# Patient Record
Sex: FEMALE | Race: WHITE | NOT HISPANIC OR LATINO | Employment: PART TIME | URBAN - METROPOLITAN AREA
[De-identification: names, ages, dates, MRNs, and addresses within clinical notes are randomized per-mention and may not be internally consistent; named-entity substitution may affect disease eponyms.]

---

## 2017-06-21 ENCOUNTER — OCCUPATIONAL MEDICINE (OUTPATIENT)
Dept: URGENT CARE | Facility: CLINIC | Age: 32
End: 2017-06-21
Payer: COMMERCIAL

## 2017-06-21 VITALS
HEIGHT: 70 IN | RESPIRATION RATE: 20 BRPM | SYSTOLIC BLOOD PRESSURE: 120 MMHG | OXYGEN SATURATION: 98 % | DIASTOLIC BLOOD PRESSURE: 78 MMHG | WEIGHT: 190 LBS | BODY MASS INDEX: 27.2 KG/M2 | HEART RATE: 62 BPM | TEMPERATURE: 97.5 F

## 2017-06-21 DIAGNOSIS — Z02.1 PRE-EMPLOYMENT DRUG SCREENING: ICD-10-CM
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
## 2017-06-21 DIAGNOSIS — S61.451A CAT BITE OF HAND, RIGHT, INITIAL ENCOUNTER: Primary | ICD-10-CM

## 2017-06-21 LAB
AMP AMPHETAMINE: NORMAL
BREATH ALCOHOL COMMENT: NORMAL
COC COCAINE: NORMAL
INT CON NEG: NORMAL
INT CON POS: NORMAL
MET METHAMPHETAMINES: NORMAL
OPI OPIATES: NORMAL
PCP PHENCYCLIDINE: NORMAL
POC BREATHALIZER: 0 PERCENT (ref 0–0.01)
POC DRUG COMMENT 753798-OCCUPATIONAL HEALTH: NEGATIVE
THC: NORMAL

## 2017-06-21 PROCEDURE — 99214 OFFICE O/P EST MOD 30 MIN: CPT | Mod: 29 | Performed by: PHYSICIAN ASSISTANT

## 2017-06-21 PROCEDURE — 82075 ASSAY OF BREATH ETHANOL: CPT | Mod: 29 | Performed by: PHYSICIAN ASSISTANT

## 2017-06-21 PROCEDURE — 80305 DRUG TEST PRSMV DIR OPT OBS: CPT | Mod: 29 | Performed by: PHYSICIAN ASSISTANT

## 2017-06-21 RX ORDER — AMOXICILLIN AND CLAVULANATE POTASSIUM 875; 125 MG/1; MG/1
1 TABLET, FILM COATED ORAL 2 TIMES DAILY
Qty: 20 TAB | Refills: 0 | Status: SHIPPED | OUTPATIENT
Start: 2017-06-21 | End: 2017-07-01

## 2017-06-21 NOTE — MR AVS SNAPSHOT
"        Jane Torrez   2017 8:45 AM   Occupational Medicine   MRN: [id number]    Department:  [facility]   Dept Phone:  [phone]    Description:  Female : 1985   Provider:  Nnamdi Mixon PA-C           Reason for Visit     Animal Bite Today cat bite right hand at ork , couple puncture wounds .    Puncture Wound           Allergies as of 2017     Allergen Noted Reactions    Nkda [No Known Drug Allergy] 2011         You were diagnosed with     Cat bite of hand, right, initial encounter   [545185]  -  Primary     Pre-employment drug screening   [210759]         Vital Signs     Blood Pressure Pulse Temperature Respirations Height Weight    120/78 mmHg 62 36.4 °C (97.5 °F) 20 1.778 m (5' 10\") 86.183 kg (190 lb)    Body Mass Index Oxygen Saturation Last Menstrual Period Smoking Status          27.26 kg/m2 98% 2010 Never Smoker         Basic Information     Date Of Birth Sex Race Ethnicity Preferred Language    1985 Female White Non- English      Your appointments     2017 11:00 AM   Workers Compensation with [facility]   [facility] ([facility])    [address] [place] [address] [address]  [place] [id number] [phone]              Problem List              ICD-10-CM Priority Class Noted - Resolved    Drug use F19.90   2010 - Present    Postpartum care and examination    2011 - Present    Closed fracture of middle or proximal phalanx or phalanges of hand [id number]   2015 - Present      Health Maintenance        Date Due Completion Dates    PAP SMEAR 2013    IMM DTaP/Tdap/Td Vaccine (3 - Td) 2025, 2011            Current Immunizations     Tdap Vaccine 2015  8:43 AM, 2011  1:00 PM      Below and/or attached are the medications your provider expects you to take. Review all of your home medications and newly ordered medications with your provider and/or pharmacist. Follow " medication instructions as directed by your provider and/or pharmacist. Please keep your medication list with you and share with your provider. Update the information when medications are discontinued, doses are changed, or new medications (including over-the-counter products) are added; and carry medication information at all times in the event of emergency situations     Allergies:  NKDA - (reactions not documented)               Medications  Valid as of: June 21, 2017 - 10:18 AM    Generic Name Brand Name Tablet Size Instructions for use    Amoxicillin-Pot Clavulanate (Tab) AUGMENTIN 875-125 MG Take 1 Tab by mouth 2 times a day for 10 days.        Hydrocodone-Acetaminophen (Tab) NORCO 5-325 MG Take 1-2 Tabs by mouth every 6 hours as needed.        Hydrocodone-Acetaminophen (Tab) NORCO  MG Take  by mouth every four hours as needed.        Oxycodone-Acetaminophen (Tab) PERCOCET 5-325 MG Take 1-2 Tabs by mouth every four hours as needed.        .                 Medicines prescribed today were sent to:     [id number] - [place] - [address]    [address] [place] [address]    Phone: [phone] Fax: [phone]    Open 24 Hours?: No      Medication refill instructions:       If your prescription bottle indicates you have medication refills left, it is not necessary to call your provider’s office. Please contact your pharmacy and they will refill your medication.    If your prescription bottle indicates you do not have any refills left, you may request refills at any time through one of the following ways: The online Fonmatch system (except Urgent Care), by calling your provider’s office, or by asking your pharmacy to contact your provider’s office with a refill request. Medication refills are processed only during regular business hours and may not be available until the next business day. Your provider may request additional information or to have a follow-up visit with you prior to  refilling your medication.   *Please Note: Medication refills are assigned a new Rx number when refilled electronically. Your pharmacy may indicate that no refills were authorized even though a new prescription for the same medication is available at the pharmacy. Please request the medicine by name with the pharmacy before contacting your provider for a refill.        Instructions    Animal Bite  An animal bite can result in a scratch on the skin, deep open cut, puncture of the skin, crush injury, or tearing away of the skin or a body part. Dogs are responsible for most animal bites. Children are bitten more often than adults. An animal bite can range from very mild to more serious. A small bite from your house pet is no cause for alarm. However, some animal bites can become infected or injure a bone or other tissue. You must seek medical care if:  · The skin is broken and bleeding does not slow down or stop after 15 minutes.  · The puncture is deep and difficult to clean (such as a cat bite).  · Pain, warmth, redness, or pus develops around the wound.  · The bite is from a stray animal or rodent. There may be a risk of rabies infection.  · The bite is from a snake, raccoon, skunk, gerardo, coyote, or bat. There may be a risk of rabies infection.  · The person bitten has a chronic illness such as diabetes, liver disease, or cancer, or the person takes medicine that lowers the immune system.  · There is concern about the location and severity of the bite.  It is important to clean and protect an animal bite wound right away to prevent infection. Follow these steps:  · Clean the wound with plenty of water and soap.  · Apply an antibiotic cream.  · Apply gentle pressure over the wound with a clean towel or gauze to slow or stop bleeding.  · Elevate the affected area above the heart to help stop any bleeding.  · Seek medical care. Getting medical care within 8 hours of the animal bite leads to the best possible  outcome.  DIAGNOSIS   Your caregiver will most likely:  · Take a detailed history of the animal and the bite injury.  · Perform a wound exam.  · Take your medical history.  Blood tests or X-rays may be performed. Sometimes, infected bite wounds are cultured and sent to a lab to identify the infectious bacteria.   TREATMENT   Medical treatment will depend on the location and type of animal bite as well as the patient's medical history. Treatment may include:  · Wound care, such as cleaning and flushing the wound with saline solution, bandaging, and elevating the affected area.  · Antibiotics.  · Tetanus immunization.  · Rabies immunization.  · Leaving the wound open to heal. This is often done with animal bites, due to the high risk of infection. However, in certain cases, wound closure with stitches, wound adhesive, skin adhesive strips, or staples may be used.   Infected bites that are left untreated may require intravenous (IV) antibiotics and surgical treatment in the hospital.  HOME CARE INSTRUCTIONS  · Follow your caregiver's instructions for wound care.  · Take all medicines as directed.  · If your caregiver prescribes antibiotics, take them as directed. Finish them even if you start to feel better.  · Follow up with your caregiver for further exams or immunizations as directed.  You may need a tetanus shot if:  · You cannot remember when you had your last tetanus shot.  · You have never had a tetanus shot.  · The injury broke your skin.  If you get a tetanus shot, your arm may swell, get red, and feel warm to the touch. This is common and not a problem. If you need a tetanus shot and you choose not to have one, there is a rare chance of getting tetanus. Sickness from tetanus can be serious.  SEEK MEDICAL CARE IF:  · You notice warmth, redness, soreness, swelling, pus discharge, or a bad smell coming from the wound.  · You have a red line on the skin coming from the wound.  · You have a fever, chills, or a  general ill feeling.  · You have nausea or vomiting.  · You have continued or worsening pain.  · You have trouble moving the injured part.  · You have other questions or concerns.  MAKE SURE YOU:  · Understand these instructions.  · Will watch your condition.  · Will get help right away if you are not doing well or get worse.     This information is not intended to replace advice given to you by your health care provider. Make sure you discuss any questions you have with your health care provider.     Document Released: 09/04/2012 Document Revised: 03/11/2013 Document Reviewed: 09/04/2012  [employer] Interactive Patient Education ©2016 [employer] Inc.            Snacksquare Access Code: [email or web address]  Expires: 7/21/2017  9:52 AM    Snacksquare  A secure, online tool to manage your health information     [employer]’s Snacksquare® is a secure, online tool that connects you to your personalized health information from the privacy of your home -- day or night - making it very easy for you to manage your healthcare. Once the activation process is completed, you can even access your medical information using the Snacksquare eva, which is available for free in the Apple Eva store or Google Play store.     Snacksquare provides the following levels of access (as shown below):   My Chart Features   [facility] Primary [facility] Doctor [facility]  Specialists [facility]  Urgent  Care Non-[facility]  Primary Care  Doctor   Email your healthcare team securely and privately 24/7 X X X    Manage appointments: schedule your next appointment; view details of past/upcoming appointments X      Request prescription refills. X      View recent personal medical records, including lab and immunizations X X X X   View health record, including health history, allergies, medications X X X X   Read reports about your outpatient visits, procedures, consult and ER notes X X X X   See your discharge summary, which is a recap of your hospital and/or ER visit that includes your  diagnosis, lab results, and care plan. X X       How to register for Joonto:  1. Go to  [email or web address].  2. Click on the Sign Up Now box, which takes you to the New Member Sign Up page. You will need to provide the following information:  a. Enter your Joonto Access Code exactly as it appears at the top of this page. (You will not need to use this code after you’ve completed the sign-up process. If you do not sign up before the expiration date, you must request a new code.)   b. Enter your date of birth.   c. Enter your home email address.   d. Click Submit, and follow the next screen’s instructions.  3. Create a [employer] ID. This will be your Joonto login ID and cannot be changed, so think of one that is secure and easy to remember.  4. Create a [employer] password. You can change your password at any time.  5. Enter your Password Reset Question and Answer. This can be used at a later time if you forget your password.   6. Enter your e-mail address. This allows you to receive e-mail notifications when new information is available in Joonto.  7. Click Sign Up. You can now view your health information.    For assistance activating your Joonto account, call [phone]

## 2017-06-21 NOTE — Clinical Note
"EMPLOYEE’S CLAIM FOR COMPENSATION/ REPORT OF INITIAL TREATMENT  FORM C-4    EMPLOYEE’S CLAIM - PROVIDE ALL INFORMATION REQUESTED   First Name  Jane Last Name  Lore Birthdate                    1985                Sex  female Claim Number   Home Address  [address] Age  31 y.o. 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 (if applicable)  putting a cat in a den     How did this injury or occupational disease occur? (Be specific an answer in detail. Use additional sheet if necessary)  was denning up a cat to bring to dinic cat got out of cage went to  cat and cat bit me.   If you believe that you have an occupational disease, when did you first have knowledge of the disability and it relationship to your employment?  n/a Witnesses to the Accident  none      Nature of Injury or Occupational Disease  Workers' Compensation  Part(s) of Body Injured or Affected  Thumb (R), ,     I certify that the above is true and correct to the best of my knowledge and that I have provided this information in " order to obtain the benefits of [place]).  I hereby authorize any physician, chiropractor, surgeon, practitioner, or other person, any hospital, including [facility] or [facility], any medical service organization, any insurance company, or other institution or organization to release to each other, any medical or other information, including benefits paid or payable, pertinent to this injury or disease, except information relative to diagnosis, treatment and/or counseling for AIDS, psychological conditions, alcohol or controlled substances, for which I must give specific authorization.  A Photostat of this authorization shall be as valid as the original.     Date   Place   Employee’s Signature   THIS REPORT MUST BE COMPLETED AND MAILED WITHIN 3 WORKING DAYS OF TREATMENT   Place  [facility]  Name of Facility  [facility]   Date  6/21/2017 Diagnosis  (S61.451A,  W55.01XA) Cat bite of hand, right, initial encounter  (primary encounter diagnosis)  (Z02.1) Pre-employment drug screening Is there evidence the injured employee was under the influence of alcohol and/or another controlled substance at the time of accident?   Hour  9:37 AM Description of Injury or Disease  The primary encounter diagnosis was Cat bite of hand, right, initial encounter. A diagnosis of Pre-employment drug screening was also pertinent to this visit. No   Treatment  Wound cleansed and dressed in clinic. Tetanus is UTD. OTC ibuprofen for pain. Rx Augmentin for abx coverage  Have you advised the patient to remain off work five days or more? No   X-Ray Findings    Comments:n/a   If Yes   From Date  To Date      From information given by the employee, together with medical evidence, can you directly connect this injury or occupational disease as job incurred?  Yes If No Full Duty  Yes Modified Duty      Is  "additional medical care by a physician indicated?  Yes If Modified Duty, Specify any Limitations / Restrictions      Do you know of any previous injury or disease contributing to this condition or occupational disease?                            No   Date  6/21/2017 Print Doctor’s Name Jasmyn Mixon PA-C I certify the employer’s copy of  this form was mailed on:   Address  [address] Insurer’s Use Only     [facility]  [address]    Provider’s Tax ID Number  [id number]  Telephone  Dept: [phone]        e-SignJOSEJASMYN LI PA-C   e-Signature: Dr. Cesar Woodruff, Medical Director Degree  JING        ORIGINAL-TREATING PHYSICIAN OR CHIROPRACTOR    PAGE 2-INSURER/TPA    PAGE 3-EMPLOYER    PAGE 4-EMPLOYEE             Form C-4 (rev10/07)              BRIEF DESCRIPTION OF RIGHTS AND BENEFITS  (Pursuant to [place]RS 616C.050)    Notice of Injury or Occupational Disease (Incident Report Form C-1): If an injury or occupational disease (OD) arises out of and in the  course of employment, you must provide written notice to your employer as soon as practicable, but no later than 7 days after the accident or  OD. Your employer shall maintain a sufficient supply of the required forms.    Claim for Compensation (Form C-4): If medical treatment is sought, the form C-4 is available at the place of initial treatment. A completed  \"Claim for Compensation\" (Form C-4) must be filed within 90 days after an accident or OD. The treating physician or chiropractor must,  within 3 working days after treatment, complete and mail to the employer, the employer's insurer and third-party , the Claim for  Compensation.    Medical Treatment: If you require medical treatment for your on-the-job injury or OD, you may be required to select a physician or  chiropractor from a list provided by your workers’ compensation insurer, if it has contracted with an Organization for Managed Care (MCO) " or  Preferred Provider Organization (PPO) or providers of health care. If your employer has not entered into a contract with an MCO or PPO, you  may select a physician or chiropractor from the Panel of Physicians and Chiropractors. Any medical costs related to your industrial injury or  OD will be paid by your insurer.    Temporary Total Disability (TTD): If your doctor has certified that you are unable to work for a period of at least 5 consecutive days, or 5  cumulative days in a 20-day period, or places restrictions on you that your employer does not accommodate, you may be entitled to TTD  compensation.    Temporary Partial Disability (TPD): If the wage you receive upon reemployment is less than the compensation for TTD to which you are  entitled, the insurer may be required to pay you TPD compensation to make up the difference. TPD can only be paid for a maximum of 24  months.    Permanent Partial Disability (PPD): When your medical condition is stable and there is an indication of a PPD as a result of your injury or  OD, within 30 days, your insurer must arrange for an evaluation by a rating physician or chiropractor to determine the degree of your PPD. The  amount of your PPD award depends on the date of injury, the results of the PPD evaluation and your age and wage.    Permanent Total Disability (PTD): If you are medically certified by a treating physician or chiropractor as permanently and totally disabled  and have been granted a PTD status by your insurer, you are entitled to receive monthly benefits not to exceed 66 2/3% of your average  monthly wage. The amount of your PTD payments is subject to reduction if you previously received a PPD award.    Vocational Rehabilitation Services: You may be eligible for vocational rehabilitation services if you are unable to return to the job due to a  permanent physical impairment or permanent restrictions as a result of your injury or occupational  disease.    Transportation and Per Chastity Reimbursement: You may be eligible for travel expenses and per chastity associated with medical treatment.    Reopening: You may be able to reopen your claim if your condition worsens after claim closure.    Appeal Process: If you disagree with a written determination issued by the insurer or the insurer does not respond to your request, you may  appeal to the Department of Administration, , by following the instructions contained in your determination letter. You must  appeal the determination within 70 days from the date of the determination letter at [address], [address], [place], [place]  [address], or [address], [place], [place] [address]. If you disagree with the  decision, you may appeal to the  Department of Administration, . You must file your appeal within 30 days from the date of the  decision  letter at [address], [address], [place], [place] [address], or [address], [place], [place] [address]. If you  disagree with a decision of an , you may file a petition for judicial review with the District Court. You must do so within 30  days of the Appeal Officer’s decision. You may be represented by an  at your own expense or you may contact the [facility] for possible  representation.    [place]  for Injured Workers (NAIW): If you disagree with a  decision, you may request that NAIW represent you  without charge at an  Hearing. For information regarding denial of benefits, you may contact the [facility] at: [address], [address], [place] [address], [phone], or [address] [facility] [address], [place] [address], [phone]    To File a Complaint with the Division: If you wish to file a complaint with the  of the Division of Industrial Relations (DIR),  please contact  the Workers’ Compensation Section, [address], [address], [place], [place] [address], telephone [phone], or  [address], [address], [place], [place] [address], telephone [phone].    For assistance with Workers’ Compensation Issues: you may contact the Office of the [facility] Consumer Health Assistance, [address] [place] [address], [address], [place], [place] [address], Toll Free 1-218.194.3577, Web site: http://govcha.Counts include 234 beds at the [facility].[place]., E-mail  [email or web address] include 234 beds at the [facility].[place].                                                                                                                                                                                                                                   __________________________________________________________________                                                                   _________________                Employee Name / Signature                                                                                                                                                       Date                                                                                                                                                                                                     D-2 (rev. 10/07)

## 2017-06-21 NOTE — Clinical Note
[facility]   [address] - [place] [address]  Phone: [phone] - Fax: [phone]        [facility] Progress Report and Disability Certification  Date of Service: 6/21/2017   No Show:  No  Date / Time of Next Visit: 6/24/2017   Claim Information   Patient Name: Jane Torrez  Claim Number:     Employer: [employer]  Date of Injury: 6/21/2017     Insurer / TPA: [employer]  ID / SSN:     Occupation: cat care giver  Diagnosis: The primary encounter diagnosis was Cat bite of hand, right, initial encounter. A diagnosis of Pre-employment drug screening was also pertinent to this visit.    Medical Information   Related to Industrial Injury? Yes    Subjective Complaints:  Date of Injury: 6/21/2017     Hours Injury: 7:05 AM    Pt notes she works at the [employer] and she went to  a cat in a holding area and it bit her right hand causing immediate swelling, bleeding and injury. Pt has not taken any Rx medications for this condition. Pt states the pain is a 1/10, aching in nature and worse when the bite occurred. Pt denies CP, SOB, NVD, paresthesias, headaches, dizziness, change in vision, hives, or other joint pain. The pt's medication list, problem list, and allergies have been evaluated and reviewed during today's visit.      Objective Findings: Right hand: Upon inspection, no ecchymoses, mild edema, mild erythema. +TTP about cat bite puncture wounds, +AROM, +SILT, STR 5/5, pulses 2+ B/L      Pre-Existing Condition(s):     Assessment:   Initial Visit    Status: Additional Care Required  Permanent Disability:No    Plan: Medication  Comments:OTC ibuprofen and RX augmentin    Diagnostics:   Comments:n/a    Comments:       Disability Information   Status: Released to Full Duty    From:  6/21/2017  Through: 6/24/2017 Restrictions are:     Physical Restrictions   Sitting:    Standing:    Stooping:   Bending:      Squatting:    Walking:    Climbing:    Pushing:      Pulling:    Other:    Reaching Above Shoulder (L):   Reaching Above Shoulder (R):       Reaching Below Shoulder (L):    Reaching Below Shoulder (R):      Not to exceed Weight Limits   Carrying(hrs):   Weight Limit(lb):   Lifting(hrs):   Weight  Limit(lb):     Comments:      Repetitive Actions   Hands: i.e. Fine Manipulations from Grasping:     Feet: i.e. Operating Foot Controls:     Driving / Operate Machinery:     Physician Name: Jasmyn Mixon PA-C Physician Signature: JASMYN Carnes PA-C e-Signature: Dr. Cesar Woodruff, Medical Director   Clinic Name / Location: [address]  [place] [address] Clinic Phone Number: Dept: [phone]   Appointment Time: 8:45 Am Visit Start Time: 9:37 AM   Check-In Time:  8:58 Am Visit Discharge Time:  10:14   Original-Treating Physician or Chiropractor    Page 2-Insurer/TPA    Page 3-Employer    Page 4-Employee

## 2017-06-21 NOTE — PATIENT INSTRUCTIONS
Animal Bite  An animal bite can result in a scratch on the skin, deep open cut, puncture of the skin, crush injury, or tearing away of the skin or a body part. Dogs are responsible for most animal bites. Children are bitten more often than adults. An animal bite can range from very mild to more serious. A small bite from your house pet is no cause for alarm. However, some animal bites can become infected or injure a bone or other tissue. You must seek medical care if:  · The skin is broken and bleeding does not slow down or stop after 15 minutes.  · The puncture is deep and difficult to clean (such as a cat bite).  · Pain, warmth, redness, or pus develops around the wound.  · The bite is from a stray animal or rodent. There may be a risk of rabies infection.  · The bite is from a snake, raccoon, skunk, gerardo, coyote, or bat. There may be a risk of rabies infection.  · The person bitten has a chronic illness such as diabetes, liver disease, or cancer, or the person takes medicine that lowers the immune system.  · There is concern about the location and severity of the bite.  It is important to clean and protect an animal bite wound right away to prevent infection. Follow these steps:  · Clean the wound with plenty of water and soap.  · Apply an antibiotic cream.  · Apply gentle pressure over the wound with a clean towel or gauze to slow or stop bleeding.  · Elevate the affected area above the heart to help stop any bleeding.  · Seek medical care. Getting medical care within 8 hours of the animal bite leads to the best possible outcome.  DIAGNOSIS   Your caregiver will most likely:  · Take a detailed history of the animal and the bite injury.  · Perform a wound exam.  · Take your medical history.  Blood tests or X-rays may be performed. Sometimes, infected bite wounds are cultured and sent to a lab to identify the infectious bacteria.   TREATMENT   Medical treatment will depend on the location and type of animal bite as  well as the patient's medical history. Treatment may include:  · Wound care, such as cleaning and flushing the wound with saline solution, bandaging, and elevating the affected area.  · Antibiotics.  · Tetanus immunization.  · Rabies immunization.  · Leaving the wound open to heal. This is often done with animal bites, due to the high risk of infection. However, in certain cases, wound closure with stitches, wound adhesive, skin adhesive strips, or staples may be used.   Infected bites that are left untreated may require intravenous (IV) antibiotics and surgical treatment in the hospital.  HOME CARE INSTRUCTIONS  · Follow your caregiver's instructions for wound care.  · Take all medicines as directed.  · If your caregiver prescribes antibiotics, take them as directed. Finish them even if you start to feel better.  · Follow up with your caregiver for further exams or immunizations as directed.  You may need a tetanus shot if:  · You cannot remember when you had your last tetanus shot.  · You have never had a tetanus shot.  · The injury broke your skin.  If you get a tetanus shot, your arm may swell, get red, and feel warm to the touch. This is common and not a problem. If you need a tetanus shot and you choose not to have one, there is a rare chance of getting tetanus. Sickness from tetanus can be serious.  SEEK MEDICAL CARE IF:  · You notice warmth, redness, soreness, swelling, pus discharge, or a bad smell coming from the wound.  · You have a red line on the skin coming from the wound.  · You have a fever, chills, or a general ill feeling.  · You have nausea or vomiting.  · You have continued or worsening pain.  · You have trouble moving the injured part.  · You have other questions or concerns.  MAKE SURE YOU:  · Understand these instructions.  · Will watch your condition.  · Will get help right away if you are not doing well or get worse.     This information is not intended to replace advice given to you by your  health care provider. Make sure you discuss any questions you have with your health care provider.     Document Released: 09/04/2012 Document Revised: 03/11/2013 Document Reviewed: 09/04/2012  Elsevier Interactive Patient Education ©2016 Elsevier Inc[employer]

## 2017-06-21 NOTE — PROGRESS NOTES
Subjective:      Pt is a 31 y.o. female who presents with Animal Bite and Puncture Wound      Date of Injury: 2017     Hours Injury: 7:05 AM    Pt notes she works at the [employer] and she went to  a cat in a holding area and it bit her right hand causing immediate swelling, bleeding and injury. Pt has not taken any Rx medications for this condition. Pt states the pain is a 1/10, aching in nature and worse when the bite occurred. Pt denies CP, SOB, NVD, paresthesias, headaches, dizziness, change in vision, hives, or other joint pain. The pt's medication list, problem list, and allergies have been evaluated and reviewed during today's visit.        Animal Bite    Puncture Wound       PMH:  Past Medical History   Diagnosis Date   • No known health problems        PSH:  Past Surgical History   Procedure Laterality Date   • Tonsillectomy     • Finger orif Right 2015     Procedure: FINGER ORIF FOURTH FINGER @ PIP JOINT;  Surgeon: Gómez Soto M.D.;  Location: SURGERY [facility];  Service:    • Ligament repair Right 2015     Procedure: LIGAMENT REPAIR;  Surgeon: Gómez Soto M.D.;  Location: SURGERY [facility];  Service:        Fam Hx:    family history includes Hyperlipidemia in her father.  Family Status   Relation Status Death Age   • Father Alive    • Mother Alive    • Maternal Grandmother Alive    • Maternal Grandfather     • Paternal Grandmother Alive    • Paternal Grandfather     • Brother Alive    • Brother Alive        Soc HX:  Social History     Social History   • Marital Status:      Spouse Name: N/A   • Number of Children: N/A   • Years of Education: N/A     Occupational History   • Not on file.     Social History Main Topics   • Smoking status: Never Smoker    • Smokeless tobacco: Not on file   • Alcohol Use: 0.0 oz/week     0 Standard drinks or equivalent per week      Comment: 3-4 per week   • Drug Use: Yes      Comment: in the  "past crystal, clean since 10/6/10   • Sexual Activity:     Partners: Male     Other Topics Concern   • Not on file     Social History Narrative         Medications:    Current outpatient prescriptions:   •  amoxicillin-clavulanate (AUGMENTIN) 875-125 MG Tab, Take 1 Tab by mouth 2 times a day for 10 days., Disp: 20 Tab, Rfl: 0  •  hydrocodone/acetaminophen (NORCO)  MG Tab, Take  by mouth every four hours as needed., Disp: , Rfl: 0  •  oxycodone-acetaminophen (PERCOCET) 5-325 MG Tab, Take 1-2 Tabs by mouth every four hours as needed., Disp: 15 Tab, Rfl: 0  •  hydrocodone-acetaminophen (NORCO) 5-325 MG Tab per tablet, Take 1-2 Tabs by mouth every 6 hours as needed., Disp: 10 Tab, Rfl: 0      Allergies:  Nkda        ROS  Constitutional: Negative for fever, chills and malaise/fatigue.   HENT: Negative for congestion and sore throat.    Eyes: Negative for blurred vision, double vision and photophobia.   Respiratory: Negative for cough and shortness of breath.    Cardiovascular: Negative for chest pain and palpitations.   Gastrointestinal: Negative for heartburn, nausea, vomiting, abdominal pain, diarrhea and constipation.   Genitourinary: Negative for dysuria and flank pain.   Musculoskeletal: Negative for joint pain and myalgias.   Skin: Negative for itching and rash. POS for cat bite into right hand  Neurological: Negative for dizziness, tingling and headaches.   Endo/Heme/Allergies: Does not bruise/bleed easily.   Psychiatric/Behavioral: Negative for depression. The patient is not nervous/anxious.           Objective:     /78 mmHg  Pulse 62  Temp(Src) 36.4 °C (97.5 °F)  Resp 20  Ht 1.778 m (5' 10\")  Wt 86.183 kg (190 lb)  BMI 27.26 kg/m2  SpO2 98%  LMP 09/09/2010     Physical Exam    Right hand: Upon inspection, no ecchymoses, mild edema, mild erythema. +TTP about cat bite puncture wounds, +AROM, +SILT, STR 5/5, pulses 2+ B/L     Constitutional: PT is oriented to person, place, and time. PT appears " well-developed and well-nourished. No distress.   HENT:   Head: Normocephalic and atraumatic.   Mouth/Throat: Oropharynx is clear and moist. No oropharyngeal exudate.   Eyes: Conjunctivae normal and EOM are normal. Pupils are equal, round, and reactive to light.   Neck: Normal range of motion. Neck supple. No thyromegaly present.   Cardiovascular: Normal rate, regular rhythm, normal heart sounds and intact distal pulses.  Exam reveals no gallop and no friction rub.    No murmur heard.  Pulmonary/Chest: Effort normal and breath sounds normal. No respiratory distress. PT has no wheezes. PT has no rales. Pt exhibits no tenderness.   Abdominal: Soft. Bowel sounds are normal. PT exhibits no distension and no mass. There is no tenderness. There is no rebound and no guarding.   Neurological: PT is alert and oriented to person, place, and time. PT has normal reflexes. No cranial nerve deficit.   Skin: Skin is warm and dry. No rash noted. PT is not diaphoretic. SEE [facility]      Psychiatric: PT has a normal mood and affect. PT behavior is normal. Judgment and thought content normal.        Assessment/Plan:     1. Cat bite of hand, right, initial encounter    - amoxicillin-clavulanate (AUGMENTIN) 875-125 MG Tab; Take 1 Tab by mouth 2 times a day for 10 days.  Dispense: 20 Tab; Refill: 0    2. Pre-employment drug screening    - POCT 6 Panel Urine Drug Screen  - POCT Breath Alcohol Test    Wound cleansed and dressed in clinic  Tetanus is UTD  RICE therapy discussed  Gentle ROM exercises discussed  WBAT right hand with caution  Ice/heat therapy discussed  NSAIDs for pain control  Rest, fluids encouraged.  AVS with medical info given.  Pt was in full understanding and agreement with the plan.  Follow-up 3 days for WC follow up appt.

## 2019-12-26 ENCOUNTER — APPOINTMENT (OUTPATIENT)
Dept: RADIOLOGY | Facility: MEDICAL CENTER | Age: 34
End: 2019-12-26
Attending: EMERGENCY MEDICINE
Payer: MEDICAID

## 2019-12-26 ENCOUNTER — HOSPITAL ENCOUNTER (EMERGENCY)
Facility: MEDICAL CENTER | Age: 34
End: 2019-12-26
Attending: EMERGENCY MEDICINE
Payer: MEDICAID

## 2019-12-26 VITALS
HEIGHT: 70 IN | DIASTOLIC BLOOD PRESSURE: 77 MMHG | BODY MASS INDEX: 31.78 KG/M2 | HEART RATE: 78 BPM | OXYGEN SATURATION: 100 % | SYSTOLIC BLOOD PRESSURE: 114 MMHG | RESPIRATION RATE: 18 BRPM | WEIGHT: 222 LBS | TEMPERATURE: 98.6 F

## 2019-12-26 DIAGNOSIS — S43.004A DISLOCATION OF RIGHT SHOULDER JOINT, INITIAL ENCOUNTER: ICD-10-CM

## 2019-12-26 PROCEDURE — A9270 NON-COVERED ITEM OR SERVICE: HCPCS | Performed by: EMERGENCY MEDICINE

## 2019-12-26 PROCEDURE — 700102 HCHG RX REV CODE 250 W/ 637 OVERRIDE(OP): Performed by: EMERGENCY MEDICINE

## 2019-12-26 PROCEDURE — 99285 EMERGENCY DEPT VISIT HI MDM: CPT

## 2019-12-26 PROCEDURE — 96374 THER/PROPH/DIAG INJ IV PUSH: CPT | Mod: XU

## 2019-12-26 PROCEDURE — 700111 HCHG RX REV CODE 636 W/ 250 OVERRIDE (IP): Performed by: EMERGENCY MEDICINE

## 2019-12-26 PROCEDURE — 73030 X-RAY EXAM OF SHOULDER: CPT | Mod: RT,XU

## 2019-12-26 PROCEDURE — 90715 TDAP VACCINE 7 YRS/> IM: CPT | Performed by: EMERGENCY MEDICINE

## 2019-12-26 PROCEDURE — 99152 MOD SED SAME PHYS/QHP 5/>YRS: CPT

## 2019-12-26 PROCEDURE — 73030 X-RAY EXAM OF SHOULDER: CPT | Mod: RT

## 2019-12-26 PROCEDURE — 96375 TX/PRO/DX INJ NEW DRUG ADDON: CPT

## 2019-12-26 PROCEDURE — 90471 IMMUNIZATION ADMIN: CPT

## 2019-12-26 PROCEDURE — 94770 HCHG CO2 EXPIRED GAS DETERMINATION: CPT | Mod: XU

## 2019-12-26 PROCEDURE — 23650 CLTX SHO DSLC W/MNPJ WO ANES: CPT

## 2019-12-26 PROCEDURE — 94760 N-INVAS EAR/PLS OXIMETRY 1: CPT

## 2019-12-26 RX ORDER — IBUPROFEN 800 MG/1
800 TABLET ORAL EVERY 8 HOURS PRN
Qty: 30 TAB | Refills: 1 | Status: SHIPPED | OUTPATIENT
Start: 2019-12-26 | End: 2020-01-06 | Stop reason: SDUPTHER

## 2019-12-26 RX ORDER — ONDANSETRON 2 MG/ML
4 INJECTION INTRAMUSCULAR; INTRAVENOUS ONCE
Status: COMPLETED | OUTPATIENT
Start: 2019-12-26 | End: 2019-12-26

## 2019-12-26 RX ORDER — HYDROCODONE BITARTRATE AND ACETAMINOPHEN 5; 325 MG/1; MG/1
1 TABLET ORAL ONCE
Status: COMPLETED | OUTPATIENT
Start: 2019-12-26 | End: 2019-12-26

## 2019-12-26 RX ORDER — HYDROCODONE BITARTRATE AND ACETAMINOPHEN 5; 325 MG/1; MG/1
1 TABLET ORAL EVERY 6 HOURS PRN
Qty: 10 TAB | Refills: 0 | Status: SHIPPED | OUTPATIENT
Start: 2019-12-26 | End: 2019-12-29

## 2019-12-26 RX ORDER — PROPOFOL 10 MG/ML
200 INJECTION, EMULSION INTRAVENOUS ONCE
Status: COMPLETED | OUTPATIENT
Start: 2019-12-26 | End: 2019-12-26

## 2019-12-26 RX ORDER — MORPHINE SULFATE 4 MG/ML
4 INJECTION, SOLUTION INTRAMUSCULAR; INTRAVENOUS ONCE
Status: COMPLETED | OUTPATIENT
Start: 2019-12-26 | End: 2019-12-26

## 2019-12-26 RX ORDER — KETOROLAC TROMETHAMINE 30 MG/ML
30 INJECTION, SOLUTION INTRAMUSCULAR; INTRAVENOUS ONCE
Status: DISCONTINUED | OUTPATIENT
Start: 2019-12-26 | End: 2019-12-26

## 2019-12-26 RX ADMIN — CLOSTRIDIUM TETANI TOXOID ANTIGEN (FORMALDEHYDE INACTIVATED), CORYNEBACTERIUM DIPHTHERIAE TOXOID ANTIGEN (FORMALDEHYDE INACTIVATED), BORDETELLA PERTUSSIS TOXOID ANTIGEN (GLUTARALDEHYDE INACTIVATED), BORDETELLA PERTUSSIS FILAMENTOUS HEMAGGLUTININ ANTIGEN (FORMALDEHYDE INACTIVATED), BORDETELLA PERTUSSIS PERTACTIN ANTIGEN, AND BORDETELLA PERTUSSIS FIMBRIAE 2/3 ANTIGEN 0.5 ML: 5; 2; 2.5; 5; 3; 5 INJECTION, SUSPENSION INTRAMUSCULAR at 13:36

## 2019-12-26 RX ADMIN — HYDROCODONE BITARTRATE AND ACETAMINOPHEN 1 TABLET: 5; 325 TABLET ORAL at 13:25

## 2019-12-26 RX ADMIN — PROPOFOL 170 MG: 10 INJECTION, EMULSION INTRAVENOUS at 13:00

## 2019-12-26 RX ADMIN — ONDANSETRON 4 MG: 2 INJECTION INTRAMUSCULAR; INTRAVENOUS at 11:25

## 2019-12-26 RX ADMIN — MORPHINE SULFATE 4 MG: 4 INJECTION INTRAVENOUS at 11:25

## 2019-12-26 SDOH — HEALTH STABILITY: MENTAL HEALTH: HOW OFTEN DO YOU HAVE A DRINK CONTAINING ALCOHOL?: MONTHLY OR LESS

## 2019-12-26 ASSESSMENT — PAIN SCALES - WONG BAKER: WONGBAKER_NUMERICALRESPONSE: DOESN'T HURT AT ALL
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
## 2019-12-26 NOTE — ED NOTES
ERP at bedside. Pt agrees with plan of care discussed by ERP. AIDET acknowledged with patient. IV established. Blood sent to lab. Medicinal interventions carried out per ERP orders. Normrangely in low position, side rail up for pt safety. Call light within reach. Will continue to monitor.

## 2019-12-26 NOTE — ED NOTES
1250 sedation start time, 20mg propofol given  1255 80mg of propofol given IV push 30mmhg end tidal   1257 70mg propofol push given   1258 sedation end time

## 2019-12-26 NOTE — ED NOTES
Patient educated on discharge instructions including narcotic prescription, verbalized understanding. VS stable. IV removed, bleeding controlled. Patient ambulated steadily and independently from ED, sylwias w patient.

## 2019-12-26 NOTE — RESPIRATORY CARE
Conscious Sedation Respiratory Update     02 at 2l/m with co2 28-30 throught whole procedure..   O2 (LPM): 2 (12/26/19 2844)

## 2020-01-06 ENCOUNTER — OFFICE VISIT (OUTPATIENT)
Dept: MEDICAL GROUP | Facility: MEDICAL CENTER | Age: 35
End: 2020-01-06
Attending: NURSE PRACTITIONER
Payer: MEDICAID

## 2020-01-06 VITALS
HEART RATE: 76 BPM | SYSTOLIC BLOOD PRESSURE: 110 MMHG | RESPIRATION RATE: 16 BRPM | WEIGHT: 220.3 LBS | HEIGHT: 70 IN | BODY MASS INDEX: 31.54 KG/M2 | DIASTOLIC BLOOD PRESSURE: 70 MMHG | TEMPERATURE: 99.5 F | OXYGEN SATURATION: 95 %

## 2020-01-06 DIAGNOSIS — Z13.228 SCREENING FOR METABOLIC DISORDER: ICD-10-CM

## 2020-01-06 DIAGNOSIS — Z76.89 ENCOUNTER TO ESTABLISH CARE: ICD-10-CM

## 2020-01-06 DIAGNOSIS — N94.6 DYSMENORRHEA: ICD-10-CM

## 2020-01-06 DIAGNOSIS — N92.0 MENORRHAGIA WITH REGULAR CYCLE: ICD-10-CM

## 2020-01-06 DIAGNOSIS — S43.004A DISLOCATION OF RIGHT SHOULDER JOINT, INITIAL ENCOUNTER: ICD-10-CM

## 2020-01-06 DIAGNOSIS — R23.3 BRUISES EASILY: ICD-10-CM

## 2020-01-06 DIAGNOSIS — Z13.0 SCREENING FOR DEFICIENCY ANEMIA: ICD-10-CM

## 2020-01-06 DIAGNOSIS — Z13.21 ENCOUNTER FOR VITAMIN DEFICIENCY SCREENING: ICD-10-CM

## 2020-01-06 DIAGNOSIS — Z13.6 SCREENING FOR CARDIOVASCULAR CONDITION: ICD-10-CM

## 2020-01-06 PROCEDURE — 99204 OFFICE O/P NEW MOD 45 MIN: CPT | Performed by: NURSE PRACTITIONER

## 2020-01-06 PROCEDURE — 99213 OFFICE O/P EST LOW 20 MIN: CPT | Performed by: NURSE PRACTITIONER

## 2020-01-06 RX ORDER — IBUPROFEN 800 MG/1
800 TABLET ORAL EVERY 8 HOURS PRN
Qty: 90 TAB | Refills: 1 | Status: SHIPPED | OUTPATIENT
Start: 2020-01-06

## 2020-01-06 ASSESSMENT — ENCOUNTER SYMPTOMS
WHEEZING: 0
COUGH: 0
CONSTIPATION: 0
FEVER: 0
DIARRHEA: 0
PALPITATIONS: 0
WEIGHT LOSS: 0
ABDOMINAL PAIN: 0
BLOOD IN STOOL: 0
CHILLS: 0
SHORTNESS OF BREATH: 0

## 2020-01-06 ASSESSMENT — PATIENT HEALTH QUESTIONNAIRE - PHQ9: CLINICAL INTERPRETATION OF PHQ2 SCORE: 0

## 2020-01-06 NOTE — ASSESSMENT & PLAN NOTE
Initially dislocated falling on 12/26/19.  She went to the ER where they repositioned her joint.  She has established with ortho.  She is in an immobilizer for the next 4 weeks.  After that they will do PT.  She takes the ibuprofen 800 mg to help with pain.  Denies numbness and tingling.

## 2020-01-06 NOTE — ASSESSMENT & PLAN NOTE
Menses are now regular. She reports an intermittently heavy and consistently painful menses.   Some days she will bleed through 5 tampons by noon.  She reports  intense cramping the first 2 days of her menses.  She reports ibuprofen is the most helpful.  She does note some clotting and increased pain with heavier flows.

## 2020-01-06 NOTE — ASSESSMENT & PLAN NOTE
Present for adulthood, bruises easily with long lasting bruises.  She does not notice that she bleeds longer if she gets a cut.  Reports bleeding in her gum when she flosses, but it stops appropriately.  She reports she has been told she has bad gums from lack of flosses.

## 2020-01-06 NOTE — PROGRESS NOTES
Chief Complaint   Patient presents with   • Shoulder Pain   • Lab Results       Subjective:     HPI:   Jane Torrez is a 34 y.o. female here to establish care, shoulder pain, lab requests,  and to discuss the evaluation and management of:      Encounter to establish care  Prior PCP: None     Other Providers:  Ortho- [facility]    Bruises easily  Present for adulthood, bruises easily with long lasting bruises.  She does not notice that she bleeds longer if she gets a cut.  Reports bleeding in her gum when she flosses, but it stops appropriately.  She reports she has been told she has bad gums from lack of flosses.      Menorrhagia with regular cycle  Menses are now regular. She reports an intermittently heavy and consistently painful menses.   Some days she will bleed through 5 tampons by noon.  She reports  intense cramping the first 2 days of her menses.  She reports ibuprofen is the most helpful.  She does note some clotting and increased pain with heavier flows.      Dislocation of right shoulder joint  Initially dislocated falling on 12/26/19.  She went to the ER where they repositioned her joint.  She has established with ortho.  She is in an immobilizer for the next 4 weeks.  After that they will do PT.  She takes the ibuprofen 800 mg to help with pain.  Denies numbness and tingling.     Dysmenorrhea  See menorrhagia for more information.      ROS  Review of Systems   Constitutional: Negative for chills, fever, malaise/fatigue and weight loss.   Respiratory: Negative for cough, shortness of breath and wheezing.    Cardiovascular: Negative for chest pain, palpitations and leg swelling.   Gastrointestinal: Negative for abdominal pain, blood in stool, constipation and diarrhea.   Musculoskeletal: Positive for joint pain.         Allergies   Allergen Reactions   • Nkda [No Known Drug Allergy]        Current medicines (including changes today)  Current Outpatient Medications   Medication Sig Dispense Refill   •  ibuprofen (MOTRIN) 800 MG Tab Take 1 Tab by mouth every 8 hours as needed for Moderate Pain. Take with food 90 Tab 1     No current facility-administered medications for this visit.      She  has a past medical history of No known health problems.  She  has a past surgical history that includes tonsillectomy (); finger orif (Right, 2015); and ligament repair (Right, 2015).  Social History     Tobacco Use   • Smoking status: Never Smoker   • Smokeless tobacco: Never Used   Substance Use Topics   • Alcohol use: Yes     Alcohol/week: 0.0 oz     Frequency: Monthly or less     Comment: 3-4 per week   • Drug use: Not Currently     Comment: in the past crystal, clean since 10/6/10       Family History   Problem Relation Age of Onset   • Hyperlipidemia Father    • Other Mother         macular degeneration   • Alzheimer's Disease Paternal Grandmother    • Heart Disease Paternal Grandmother      Family Status   Relation Name Status   • Fa  Alive   • Mo  Alive   • MGMo  Alive   • MGFa     • PGMo  Alive   • PGFa     • Bro  Alive   • Bro  Alive       Patient Active Problem List    Diagnosis Date Noted   • Encounter to establish care 2020   • Bruises easily 2020   • Menorrhagia with regular cycle 2020   • Dislocation of right shoulder joint 2020   • Dysmenorrhea 2020   • Hx of amphetamine abuse ([facility]) 2010          Objective:     /70 (BP Location: Left arm, Patient Position: Sitting, BP Cuff Size: Adult)  There is no height or weight on file to calculate BMI.    Physical Exam:  Physical Exam   Constitutional: She is oriented to person, place, and time and well-developed, well-nourished, and in no distress. No distress.   HENT:   Head: Normocephalic.   Right Ear: Tympanic membrane and external ear normal.   Left Ear: Tympanic membrane and external ear normal.   Eyes: Pupils are equal, round, and reactive to light. Conjunctivae and EOM are normal.   Neck: Normal  range of motion. Neck supple. No tracheal deviation present.   Cardiovascular: Normal rate, regular rhythm, normal heart sounds and intact distal pulses.   Pulmonary/Chest: Effort normal and breath sounds normal.   Abdominal: Soft. Bowel sounds are normal.   Musculoskeletal: Normal range of motion.      Right shoulder: She exhibits pain.      Comments: Right shoulder immobilized, did not assess mobility.   Lymphadenopathy:        Head (right side): No preauricular adenopathy present.        Head (left side): No preauricular adenopathy present.     She has no cervical adenopathy.   Neurological: She is alert and oriented to person, place, and time. She has normal sensation, normal strength and intact cranial nerves. Gait normal.   Skin: Skin is warm and dry.   Psychiatric: Affect and judgment normal.          Assessment and Plan:     The following treatment plan was discussed:    1. Menorrhagia with regular cycle  REFERRAL TO GYNECOLOGY    ibuprofen (MOTRIN) 800 MG Tab  -Chronic problem, unstable.  Patient is unsure if she would like to carry further children.  She is interested in establishing with gynecology for further work-up of her menorrhagia and dysmenorrhea.  She would also like to get her Pap smear while there, I am agreeable.  Referral to gynecology placed.   2. Dysmenorrhea  ibuprofen (MOTRIN) 800 MG Tab  REFERRAL TO GYNECOLOGY  -Chronic problem, unstable.  See menorrhagia above for plan of care.   3. Bruises easily  Comp Metabolic Panel    CBC WITH DIFFERENTIAL  -Chronic problem, unclear stability.  Unclear etiology, will check her CBC and CMP.  Low suspicion for coagulation problem as the patient reports she has no trouble stopping bleeding from a cut or when flossing.   4. Dislocation of right shoulder joint, initial encounter  ibuprofen (MOTRIN) 800 MG Tab  -New problem.  Follow plan of care per Orth O.  Encouraged to take ibuprofen with food and discontinue use should she develop abdominal pain.   5.  Encounter to establish care   we discussed establishing with gynecology to get her Pap smear completed.  She reports it is been many years since her last Pap smear.   6. Screening for metabolic disorder  Comp Metabolic Panel    HEMOGLOBIN A1C    MICROALBUMIN CREAT RATIO URINE    TSH WITH REFLEX TO FT4   7. Screening for cardiovascular condition  Lipid Profile   8. Screening for deficiency anemia  CBC WITH DIFFERENTIAL   9. Encounter for vitamin deficiency screening  VITAMIN D,25 HYDROXY       Any change or worsening of signs or symptoms, patient encouraged to follow-up or report to emergency room for further evaluation. Patient verbalizes understanding and agrees.    Follow-Up: No follow-ups on file.  I will contact the patient with lab results and we will determine follow-up at that time.  I have notified the patient that if she needs a physical therapy order for her shoulder to just reach out to me and I would be happy to place order.      PLEASE NOTE: This dictation was created using voice recognition software. I have made every reasonable attempt to correct obvious errors, but I expect that there are errors of grammar and possibly content that I did not discover before finalizing the note.

## 2020-01-10 ENCOUNTER — HOSPITAL ENCOUNTER (OUTPATIENT)
Dept: LAB | Facility: MEDICAL CENTER | Age: 35
End: 2020-01-10
Attending: NURSE PRACTITIONER
Payer: MEDICAID

## 2020-01-10 DIAGNOSIS — Z13.6 SCREENING FOR CARDIOVASCULAR CONDITION: ICD-10-CM

## 2020-01-10 DIAGNOSIS — R23.3 BRUISES EASILY: ICD-10-CM

## 2020-01-10 DIAGNOSIS — Z13.21 ENCOUNTER FOR VITAMIN DEFICIENCY SCREENING: ICD-10-CM

## 2020-01-10 DIAGNOSIS — Z13.228 SCREENING FOR METABOLIC DISORDER: ICD-10-CM

## 2020-01-10 DIAGNOSIS — Z13.0 SCREENING FOR DEFICIENCY ANEMIA: ICD-10-CM

## 2020-01-10 LAB
ALBUMIN SERPL BCP-MCNC: 3.9 G/DL (ref 3.2–4.9)
ALBUMIN/GLOB SERPL: 1.3 G/DL
ALP SERPL-CCNC: 49 U/L (ref 30–99)
ALT SERPL-CCNC: 20 U/L (ref 2–50)
ANION GAP SERPL CALC-SCNC: 9 MMOL/L (ref 0–11.9)
AST SERPL-CCNC: 18 U/L (ref 12–45)
BASOPHILS # BLD AUTO: 0.3 % (ref 0–1.8)
BASOPHILS # BLD: 0.02 K/UL (ref 0–0.12)
BILIRUB SERPL-MCNC: 0.4 MG/DL (ref 0.1–1.5)
BUN SERPL-MCNC: 14 MG/DL (ref 8–22)
CALCIUM SERPL-MCNC: 8.9 MG/DL (ref 8.5–10.5)
CHLORIDE SERPL-SCNC: 104 MMOL/L (ref 96–112)
CHOLEST SERPL-MCNC: 185 MG/DL (ref 100–199)
CO2 SERPL-SCNC: 25 MMOL/L (ref 20–33)
CREAT SERPL-MCNC: 0.73 MG/DL (ref 0.5–1.4)
CREAT UR-MCNC: 52.1 MG/DL
EOSINOPHIL # BLD AUTO: 0.18 K/UL (ref 0–0.51)
EOSINOPHIL NFR BLD: 2.7 % (ref 0–6.9)
ERYTHROCYTE [DISTWIDTH] IN BLOOD BY AUTOMATED COUNT: 44.4 FL (ref 35.9–50)
EST. AVERAGE GLUCOSE BLD GHB EST-MCNC: 111 MG/DL
FASTING STATUS PATIENT QL REPORTED: NORMAL
GLOBULIN SER CALC-MCNC: 3 G/DL (ref 1.9–3.5)
GLUCOSE SERPL-MCNC: 79 MG/DL (ref 65–99)
HBA1C MFR BLD: 5.5 % (ref 0–5.6)
HCT VFR BLD AUTO: 44.2 % (ref 37–47)
HDLC SERPL-MCNC: 48 MG/DL
HGB BLD-MCNC: 14.5 G/DL (ref 12–16)
IMM GRANULOCYTES # BLD AUTO: 0.02 K/UL (ref 0–0.11)
IMM GRANULOCYTES NFR BLD AUTO: 0.3 % (ref 0–0.9)
LDLC SERPL CALC-MCNC: 125 MG/DL
LYMPHOCYTES # BLD AUTO: 2.06 K/UL (ref 1–4.8)
LYMPHOCYTES NFR BLD: 30.9 % (ref 22–41)
MCH RBC QN AUTO: 30.4 PG (ref 27–33)
MCHC RBC AUTO-ENTMCNC: 32.8 G/DL (ref 33.6–35)
MCV RBC AUTO: 92.7 FL (ref 81.4–97.8)
MICROALBUMIN UR-MCNC: 0.8 MG/DL
MICROALBUMIN/CREAT UR: 15 MG/G (ref 0–30)
MONOCYTES # BLD AUTO: 0.44 K/UL (ref 0–0.85)
MONOCYTES NFR BLD AUTO: 6.6 % (ref 0–13.4)
NEUTROPHILS # BLD AUTO: 3.95 K/UL (ref 2–7.15)
NEUTROPHILS NFR BLD: 59.2 % (ref 44–72)
NRBC # BLD AUTO: 0 K/UL
NRBC BLD-RTO: 0 /100 WBC
PLATELET # BLD AUTO: 256 K/UL (ref 164–446)
PMV BLD AUTO: 10.5 FL (ref 9–12.9)
POTASSIUM SERPL-SCNC: 3.9 MMOL/L (ref 3.6–5.5)
PROT SERPL-MCNC: 6.9 G/DL (ref 6–8.2)
RBC # BLD AUTO: 4.77 M/UL (ref 4.2–5.4)
SODIUM SERPL-SCNC: 138 MMOL/L (ref 135–145)
TRIGL SERPL-MCNC: 61 MG/DL (ref 0–149)
WBC # BLD AUTO: 6.7 K/UL (ref 4.8–10.8)

## 2020-01-10 PROCEDURE — 83036 HEMOGLOBIN GLYCOSYLATED A1C: CPT

## 2020-01-10 PROCEDURE — 36415 COLL VENOUS BLD VENIPUNCTURE: CPT

## 2020-01-10 PROCEDURE — 82043 UR ALBUMIN QUANTITATIVE: CPT

## 2020-01-10 PROCEDURE — 84443 ASSAY THYROID STIM HORMONE: CPT

## 2020-01-10 PROCEDURE — 80053 COMPREHEN METABOLIC PANEL: CPT

## 2020-01-10 PROCEDURE — 80061 LIPID PANEL: CPT

## 2020-01-10 PROCEDURE — 82306 VITAMIN D 25 HYDROXY: CPT

## 2020-01-10 PROCEDURE — 85025 COMPLETE CBC W/AUTO DIFF WBC: CPT

## 2020-01-10 PROCEDURE — 82570 ASSAY OF URINE CREATININE: CPT

## 2020-01-11 LAB
25(OH)D3 SERPL-MCNC: 31 NG/ML (ref 30–100)
TSH SERPL DL<=0.005 MIU/L-ACNC: 2.4 UIU/ML (ref 0.38–5.33)

## 2020-01-13 NOTE — RESULT ENCOUNTER NOTE
"Please call pt (pronounced \"SHEona\") and let them know I got her lab results.  She is not anemic.  Her thyroid function is normal.  Her kidney function is normal.  She does not have diabetes.  Her vitamin D level is normal, although on the low side of normal, so I would like her to take an over-the-counter vitamin D3 supplement, 2000 units daily. Your LDL cholesterol lab is elevated at 125, goal is under 100.  To improve this we recommend increasing physical activity (goal is 30 minutes of exercise 5 days a week), decrease the amount of saturated fats and cholesterol found in your diet (especially from pork and beef sources), and increase the amount of the good fats in your diet like salmon, avocado, olive oil.  Her liver function looks good.  I would like to see her implement these lifestyle changes and then check back with me in approximately 4 months and we will redraw her cholesterol labs.  Thank you  "

## 2020-01-30 ENCOUNTER — PHYSICAL THERAPY (OUTPATIENT)
Dept: PHYSICAL THERAPY | Facility: REHABILITATION | Age: 35
End: 2020-01-30
Attending: ORTHOPAEDIC SURGERY
Payer: MEDICAID

## 2020-01-30 DIAGNOSIS — M25.511 RIGHT SHOULDER PAIN, UNSPECIFIED CHRONICITY: ICD-10-CM

## 2020-01-30 DIAGNOSIS — M24.411 RECURRENT DISLOCATION OF RIGHT SHOULDER: ICD-10-CM

## 2020-01-30 PROCEDURE — 97161 PT EVAL LOW COMPLEX 20 MIN: CPT

## 2020-01-30 ASSESSMENT — ENCOUNTER SYMPTOMS
PAIN SCALE AT HIGHEST: 4
PAIN TIMING: OCCASIONAL
PAIN SCALE: 0
PAIN SCALE AT LOWEST: 3
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
## 2020-02-17 ENCOUNTER — PHYSICAL THERAPY (OUTPATIENT)
Dept: PHYSICAL THERAPY | Facility: REHABILITATION | Age: 35
End: 2020-02-17
Attending: ORTHOPAEDIC SURGERY
Payer: MEDICAID

## 2020-02-17 DIAGNOSIS — M24.411 RECURRENT DISLOCATION OF RIGHT SHOULDER: ICD-10-CM

## 2020-02-17 DIAGNOSIS — M25.511 RIGHT SHOULDER PAIN, UNSPECIFIED CHRONICITY: ICD-10-CM

## 2020-02-17 PROCEDURE — 97014 ELECTRIC STIMULATION THERAPY: CPT

## 2020-02-17 PROCEDURE — 97110 THERAPEUTIC EXERCISES: CPT

## 2020-02-17 PROCEDURE — 97112 NEUROMUSCULAR REEDUCATION: CPT

## 2020-02-17 NOTE — OP THERAPY DAILY TREATMENT
Outpatient Physical Therapy  DAILY TREATMENT     [facility] Physical [address].  [address]  [place] [address]  Phone:  [phone]  Fax:  [phone]    Date: 02/17/2020    Patient: Jane Torrez  YOB: 1985  MRN: [id number]     Time Calculation  Start time: 0830  Stop time: 0915 Time Calculation (min): 45 minutes       Chief Complaint: Shoulder Problem    Visit #: 2    SUBJECTIVE:  I did simpler things the first week. I was a little sore after the first week. The second week I was back on building. By at about 1 o'clock I'm pretty sore.  I slipped in the shoulder and caught myself with my R arm. It hurt. I see Dr. Arias next Wednesday. I'm able to do a low ponytail and a curling iron to my bangs so far. I'm only sleeping on my back or L side, it still hurts to lie on the R side. I didn't do the homework as much as I could have been, I'm just being honest; definitely not everyday like you said.    Goals:   Short Term Goals:   1. Pt will be independent with written HEP. (Partially met; pt reports partial compliance to hep)     Short term goal time span:  2-4 weeks      Long Term Goals:    1. Pt will be independent with written HEP.  2. Pt will have a QuickDash score improvement of 30% or greater (eval:34.09)  3. Pt will be able to demonstrate R GH AROM elevation within 15 deg of uninvolved shoulder in order to return to ADL's.  4. Pt will be able to demonstrate R GH ER within 15 deg of uninvolved shoulder in order to return to ADL's and self grooming/showering.      Long term goal time span:  6-8 weeks    OBJECTIVE:  Current objective measures:        GH AROM:   R GH flexion: 105 *tightness   R GH abduction: 80* tightness  R GH ER: 40 at 0 deg GH abd  R GH IR: L5 (LUE at T3)    GH AROM:   R GH flexion: 110 *tightness   R GH abduction: 95* tightness  R GH ER: 40 at 0 deg GH abd    Therapeutic Exercises (CPT 22702):     1. UBE, x3 min; alt every 30 sec fwd and bwd,  warm up    2. Posture education, review    3. Pendulums, review    4. Wall walk, review    5. GH AAROM with staff; 1x10 ea, review    8. Scapular retractions seated, 1x10, no pain    10. UPOC: 4/2/20      Therapeutic Exercise Summary: Pt education: Educated about tight musculature post sling usage. Educated about importance of mobilizing shoulder each day.        Therapeutic Treatments and Modalities:     1. E Stim Unattended (CPT 61872), IFC and heat to ant and post shoulder x15 min    2. Neuromuscular Re-education (CPT 20066), see summary below, x13 min    Therapeutic Treatment and Modalities Summary: GH PA's and inferior glides grade III with increasing GH elevation and ER with pt in supine. Then SL scapular mobilizations in L SL PROM followed by AROM assistance by pt grade III. Hypmobility noted all directions abd and add>sup/inf translations. Pt reported sig pain with tears in eyes with abd and add during txt but no increase afterwards. Pt reports min compliance with scapular retractions at home.     Time-based treatments/modalities:  Therapeutic exercise minutes (CPT 23461): 17 minutes  Neuromusc re-ed, balance, coor, post minutes (CPT 30026): 13 minutes         ASSESSMENT:   Response to treatment:   Pt is 8 weeks post R Shoulder anterior dislocation (12/26/19). First time seen today since evaluation due to awaiting medicaid auth. Reports semi compliance with hep; pt educated about importance of mobilizing GH joint several times a day in order to improve ROM. Manual today with sig hypomobility at scapula. Improvements in GH AROM after neuro-re-ed today. Pt has follow up with Dr. Arias on 2/26/20.     PLAN/RECOMMENDATIONS:   Plan for treatment: therapy treatment to continue next visit.  Planned interventions for next visit: continue with current treatment. Review mobilization hep; continue manual to increase AROM. Caution: Sensitivity and pain with scapular adduc/abduc PROM.

## 2020-02-20 ENCOUNTER — PHYSICAL THERAPY (OUTPATIENT)
Dept: PHYSICAL THERAPY | Facility: REHABILITATION | Age: 35
End: 2020-02-20
Attending: ORTHOPAEDIC SURGERY
Payer: MEDICAID

## 2020-02-20 DIAGNOSIS — M25.511 RIGHT SHOULDER PAIN, UNSPECIFIED CHRONICITY: ICD-10-CM

## 2020-02-20 DIAGNOSIS — M24.411 RECURRENT DISLOCATION OF RIGHT SHOULDER: ICD-10-CM

## 2020-02-20 PROCEDURE — 97110 THERAPEUTIC EXERCISES: CPT

## 2020-02-20 PROCEDURE — 97112 NEUROMUSCULAR REEDUCATION: CPT

## 2020-02-20 NOTE — OP THERAPY DAILY TREATMENT
Outpatient Physical Therapy  DAILY TREATMENT     [facility] Physical [address].  [address]  [place] [address]  Phone:  [phone]  Fax:  [phone]    Date: 02/20/2020    Patient: Jane Torrez  YOB: 1985  MRN: [id number]     Time Calculation  Start time: 0830  Stop time: 0900 Time Calculation (min): 30 minutes       Chief Complaint: Shoulder Problem    Visit #: 3    SUBJECTIVE:  The homework on the floor with the PVC pipe is hard. I feel really tight. I did some more of the homework. I wasn't really that sore after the last session.     Goals:   Short Term Goals:   1. Pt will be independent with written HEP. (Partially met; pt reports partial compliance to hep)     Short term goal time span:  2-4 weeks      Long Term Goals:    1. Pt will be independent with written HEP.  2. Pt will have a QuickDash score improvement of 30% or greater (eval:34.09)  3. Pt will be able to demonstrate R GH AROM elevation within 15 deg of uninvolved shoulder in order to return to ADL's.  4. Pt will be able to demonstrate R GH ER within 15 deg of uninvolved shoulder in order to return to ADL's and self grooming/showering.      Long term goal time span:  6-8 weeks    OBJECTIVE:  Current objective measures:        GH AROM: Pre-treatment  R GH flexion: 100 *tightness   R GH abduction: 90* tightness   R GH ER: 46 at 0 deg GH abd     GH AROM: Post-treatment  R GH flexion: 112 *tightness  R GH abduction: 104* tightness   R GH ER: 46 at 0 deg GH abd         Therapeutic Exercises (CPT 46626):     1. UBE, x3 min; alt every 30 sec fwd and bwd; level 2, warm up    6. Pulleys, flexion emphasis; 1x15    7. Pt education, Reminded about importance of mobilizing GH joint several times per day outside of PT clinic    8. SL scapular mobilizations (inf-sup and abd-add), 1x15 ea, no pain; added to hep    10. UPOC: 4/2/20      Therapeutic Exercise Summary:         Therapeutic Treatments and Modalities:      2. Neuromuscular Re-education (CPT 77235), see summary below, x15 min    Therapeutic Treatment and Modalities Summary: Neuro re-ed:   GH PA's and inferior glides initiating with grade I and II progressing to grade III and IV with decreased guarding; progressed with increasing GH elevation and ER with pt in supine. Then gentle STM to R rhomboids and R T/S paraspinals (TTP per pt).            Time-based treatments/modalities:  Therapeutic exercise minutes (CPT 62452): 15 minutes  Neuromusc re-ed, balance, coor, post minutes (CPT 32710): 15 minutes         ASSESSMENT:   Response to treatment:   Pt is 8 weeks post R Shoulder anterior dislocation (12/26/19). Increase in GH AROM after neuro- re ed mobilizations today; increased tolerance to therapy today with reduced gaurding. Progress limited due to partial compliance to hep. Pt has follow up with Dr. Arias on 2/26/20.     PLAN/RECOMMENDATIONS:   Plan for treatment: therapy treatment to continue next visit.  Planned interventions for next visit: continue with current treatment. Review mobilization hep; continue manual  Re-ed to increase AROM. Caution: Sensitivity and pain with scapular adduc/abduc PROM. Sig TTP R rhomboids and T/S paraspinals.

## 2020-02-24 NOTE — OP THERAPY DAILY TREATMENT
Outpatient Physical Therapy  DAILY TREATMENT     [facility] Physical [address].  [address]  [place] [address]  Phone:  [phone]  Fax:  [phone]    Date: 02/25/2020    Patient: Jane Torrez  YOB: 1985  MRN: [id number]     Time Calculation  Start time: 0830  Stop time: 0859 Time Calculation (min): 29 minutes       Chief Complaint: Shoulder Problem    Visit #: 4    SUBJECTIVE:  I did more of the stretching and I feel like it's actually loosening up. The exercise on the ground, I do it a few and then I start going further. I'm able to do my own ponytail with a little difficulty. Work is okay, I feel okay, I've been weaning off the medication.       Goals:   Short Term Goals:   1. Pt will be independent with written HEP. (Partially met; pt reports partial compliance to hep)     Short term goal time span:  2-4 weeks      Long Term Goals:    1. Pt will be independent with written HEP.  2. Pt will have a QuickDash score improvement of 30% or greater (eval:34.09)  3. Pt will be able to demonstrate R GH AROM elevation within 15 deg of uninvolved shoulder in order to return to ADL's.  4. Pt will be able to demonstrate R GH ER within 15 deg of uninvolved shoulder in order to return to ADL's and self grooming/showering.      Long term goal time span:  6-8 weeks    OBJECTIVE:  Current objective measures:        GH AROM: Pre-treatment  R GH flexion: 110 *tightness   R GH abduction: 97* tightness   R GH ER: 41 at 0 deg GH abd     GH AROM: Post-treatment  R GH flexion: 120 *tightness  R GH abduction: 111* tightness   R GH ER: 46 at 0 deg GH abd         Therapeutic Exercises (CPT 00929):     1. UBE, x4 min; alt every 30 sec fwd and bwd; level 2, warm up; fatigue after    6. Pulleys, flexion emphasis; 1x15    7. Pt education, Reminded about importance of mobilizing GH joint several times per day outside of PT clinic; educated about compensatory patterns such as using L/S for  increased GH range of motion    8. SL scapular mobilizations (inf-sup and abd-add), 1x15 ea direction in SL position, reviewed hep    10. UPOC: 4/2/20      Therapeutic Exercise Summary:         Therapeutic Treatments and Modalities:     2. Neuromuscular Re-education (CPT 37147), see summary below, x20 min    Therapeutic Treatment and Modalities Summary: Neuro re-ed:   PA's grade III and IV to T/S T1-T12; no pain but sig hypomobility. Then GH PA's and inferior glides grade III and IV with decreased guarding in today's session; progressed with increasing GH elevation and ER with pt in hooklying. Then subscapularis release x 10 with pt in L SL position; increasing AROM after. (Able to tolerate this session).    Increased GH AROM following treatment today.            Time-based treatments/modalities:  Therapeutic exercise minutes (CPT 68645): 9 minutes  Neuromusc re-ed, balance, coor, post minutes (CPT 33131): 20 minutes         ASSESSMENT:   Response to treatment:   Pt is 9 weeks post R Shoulder anterior dislocation (12/26/19). Progress has been limited due to awaiting expedited auth from insurance and due to partial compliance to hep. Pt now reporting is performing hep daily and is noticing improvements in ADL's. Continued to educate pt on importance of hep daily and often throughout the day. Educated pt to utilize heat at home if soreness post work to encourage blood flow to shoulder. Able to mobilize shoulder blades today with subscap release with decreased discomfort following PA's to T/S. Pt education to avoid L/S lordosis with GH elevation and avoid poor compensatory strategies. Pt request to skip modalities. Pt has follow up with Dr. Arias on 2/26/20.       PLAN/RECOMMENDATIONS:   Plan for treatment: therapy treatment to continue next visit.  Planned interventions for next visit: continue with current treatment. Progress hep as tolerated; continue manual neuro Re-ed to increase AROM.

## 2020-02-25 ENCOUNTER — PHYSICAL THERAPY (OUTPATIENT)
Dept: PHYSICAL THERAPY | Facility: REHABILITATION | Age: 35
End: 2020-02-25
Attending: ORTHOPAEDIC SURGERY
Payer: MEDICAID

## 2020-02-25 DIAGNOSIS — M25.511 RIGHT SHOULDER PAIN, UNSPECIFIED CHRONICITY: ICD-10-CM

## 2020-02-25 DIAGNOSIS — M24.411 RECURRENT DISLOCATION OF RIGHT SHOULDER: ICD-10-CM

## 2020-02-25 PROCEDURE — 97110 THERAPEUTIC EXERCISES: CPT

## 2020-02-25 PROCEDURE — 97140 MANUAL THERAPY 1/> REGIONS: CPT

## 2020-02-25 PROCEDURE — 97112 NEUROMUSCULAR REEDUCATION: CPT

## 2020-02-27 NOTE — OP THERAPY DAILY TREATMENT
Outpatient Physical Therapy  DAILY TREATMENT     [facility] Physical [address].  [address]  [place] [address]  Phone:  [phone]  Fax:  [phone]    Date: 02/28/2020    Patient: Jane Torrez  YOB: 1985  MRN: [id number]     Time Calculation  Start time: 1502  Stop time: 1545 Time Calculation (min): 43 minutes       Chief Complaint: Shoulder Problem    Visit #: 5    SUBJECTIVE:  I'm doing okay. The doctor said I'm about 80% of the way there and I think he likes what he sees. I'm a little bit sore and I've been getting around to the exercises.       Goals:   Short Term Goals:   1. Pt will be independent with written HEP. (Partially met; pt reports partial compliance to hep)     Short term goal time span:  2-4 weeks      Long Term Goals:    1. Pt will be independent with written HEP.  2. Pt will have a QuickDash score improvement of 30% or greater (eval:34.09)  3. Pt will be able to demonstrate R GH AROM elevation within 15 deg of uninvolved shoulder in order to return to ADL's.  4. Pt will be able to demonstrate R GH ER within 15 deg of uninvolved shoulder in order to return to ADL's and self grooming/showering.      Long term goal time span:  6-8 weeks    OBJECTIVE:  Current objective measures:        GH AROM: Pre-treatment  R GH flexion: 106 *tightness   R GH abduction: 97* tightness   R GH ER: 40 at 0 deg GH abd     GH AROM: Post-treatment  R GH flexion: 120 *tightness  R GH abduction: 111 tightness   R GH ER: 55 at 0 deg GH abd         Therapeutic Exercises (CPT 20168):     1. UBE, x4 min; alt every 30 sec fwd and bwd; level 2, warm up; fatigue after    6. Pulleys, flexion and IR emphasis 1x15, Increased discomfort with GH IR AAROM; educated to perform gently to tolerance    8. SL scapular mobilizations (inf-sup and abd-add), reviewed hep    9. Sleeper stretch, 2x15 with 2 sec hold, hep    13. UPOC: 4/2/20      Therapeutic Exercise Summary:          Therapeutic Treatments and Modalities:     1. E Stim Unattended (CPT 44423), IFC and heat to R shoulder x 15 min    2. Neuromuscular Re-education (CPT 37332), see summary below, x9min    Therapeutic Treatment and Modalities Summary: Neuro re-ed:   L scapular mobs all directions Grade III-IV with pt R SL (pillow b/w knees for comfort) and pillow barrier between pt and therapist followed by L subscap release 2x10.     No discomfort with aggressive mobs today; gains in GH flexion and ER post treatment.            Time-based treatments/modalities:  Therapeutic exercise minutes (CPT 22358): 19 minutes  Neuromusc re-ed, balance, coor, post minutes (CPT 57629): 9 minutes         ASSESSMENT:   Response to treatment:   Pt is 9 weeks post R Shoulder anterior dislocation (12/26/19).   Increase in R scapular mobility today; able to tolerate aggressive scapular mobs today in side lying with increasing GH AROM post txt. Pt maintaining gains between sessions.       PLAN/RECOMMENDATIONS:   Plan for treatment: therapy treatment to continue next visit.  Planned interventions for next visit: continue with current treatment. Progress hep as tolerated; continue manual neuro Re-ed to increase AROM.

## 2020-02-28 ENCOUNTER — PHYSICAL THERAPY (OUTPATIENT)
Dept: PHYSICAL THERAPY | Facility: REHABILITATION | Age: 35
End: 2020-02-28
Attending: ORTHOPAEDIC SURGERY
Payer: MEDICAID

## 2020-02-28 DIAGNOSIS — M24.411 RECURRENT DISLOCATION OF RIGHT SHOULDER: ICD-10-CM

## 2020-02-28 DIAGNOSIS — M25.511 RIGHT SHOULDER PAIN, UNSPECIFIED CHRONICITY: ICD-10-CM

## 2020-02-28 PROCEDURE — 97110 THERAPEUTIC EXERCISES: CPT

## 2020-02-28 PROCEDURE — 97112 NEUROMUSCULAR REEDUCATION: CPT

## 2020-02-28 PROCEDURE — 97014 ELECTRIC STIMULATION THERAPY: CPT

## 2020-03-02 ENCOUNTER — PHYSICAL THERAPY (OUTPATIENT)
Dept: PHYSICAL THERAPY | Facility: REHABILITATION | Age: 35
End: 2020-03-02
Attending: ORTHOPAEDIC SURGERY
Payer: MEDICAID

## 2020-03-02 DIAGNOSIS — M24.411 RECURRENT DISLOCATION OF RIGHT SHOULDER: ICD-10-CM

## 2020-03-02 DIAGNOSIS — M25.511 RIGHT SHOULDER PAIN, UNSPECIFIED CHRONICITY: ICD-10-CM

## 2020-03-02 PROCEDURE — 97110 THERAPEUTIC EXERCISES: CPT

## 2020-03-02 PROCEDURE — 97112 NEUROMUSCULAR REEDUCATION: CPT

## 2020-03-02 NOTE — OP THERAPY DAILY TREATMENT
Outpatient Physical Therapy  DAILY TREATMENT     [facility] Physical [address].  [address]  [place] [address]  Phone:  [phone]  Fax:  [phone]    Date: 03/02/2020    Patient: Jane Torrez  YOB: 1985  MRN: [id number]     Time Calculation  Start time: 1501  Stop time: 1531 Time Calculation (min): 30 minutes       Chief Complaint: Shoulder Problem    Visit #: 6    SUBJECTIVE:  I definitely felt the new exercises, I think in a good way. I have to cancel my other appointment this week because I'm getting my hair done so I'm only here once this week but I promise I'll do my homework.       Goals:   Short Term Goals:   1. Pt will be independent with written HEP. (Partially met; pt reports partial compliance to hep)     Short term goal time span:  2-4 weeks      Long Term Goals:    1. Pt will be independent with written HEP.  2. Pt will have a QuickDash score improvement of 30% or greater (eval:34.09)  3. Pt will be able to demonstrate R GH AROM elevation within 15 deg of uninvolved shoulder in order to return to ADL's.  4. Pt will be able to demonstrate R GH ER within 15 deg of uninvolved shoulder in order to return to ADL's and self grooming/showering.      Long term goal time span:  6-8 weeks    OBJECTIVE:  Current objective measures:        GH AROM: Pre-treatment  R GH flexion: 100 *tightness   R GH abduction: 90* tightness   R GH ER: 40 at 0 deg GH abd     GH AROM: Post-treatment  R GH flexion: 115 *tightness  R GH abduction: 97 tightness   R GH ER: 51 at 0 deg GH abd         Therapeutic Exercises (CPT 88992):     1. UBE, x3 min; alt every 30 sec fwd and bwd; level 2, warm up; fatigue after    6. Pulleys, flexion and IR emphasis 1x15 ea, Increased discomfort with GH IR AAROM; educated to perform gently to tolerance    9. Sleeper stretch, 2x15 with 2 sec hold, hep    10. FR progression; 1x15, open book pec stretch, Arms maintained in neutral during stretch  rather than full GH ER, alt GH flexion    13. UPOC: 4/2/20      Therapeutic Exercise Summary:         Therapeutic Treatments and Modalities:     1. E Stim Unattended (CPT 46007), IFC and heat to R shoulder x 15 min    2. Neuromuscular Re-education (CPT 39728), see summary below, x9min    Therapeutic Treatment and Modalities Summary: Neuro re-ed:   GH PA's and inferior glides grade III with increasing GH elevation and ER with pt in supine.    L scapular mobs all directions Grade III-IV with pt R SL (pillow b/w knees for comfort) and pillow barrier between pt and therapist followed by L subscap release 2x10. Increasing scapular mobility              Time-based treatments/modalities:  Therapeutic exercise minutes (CPT 12324): 21 minutes  Neuromusc re-ed, balance, coor, post minutes (CPT 67333): 9 minutes     ASSESSMENT:   Response to treatment:   Pt is 10 weeks post R Shoulder anterior dislocation (12/26/19).   Continues to have R GH AROM limitations in all directions, however maintaining gains. Pt reporting sig stiffness today without having done hep this morning. Pt educated re: importance of continued hep several times per day.      PLAN/RECOMMENDATIONS:   Plan for treatment: therapy treatment to continue next visit.  Planned interventions for next visit: continue with current treatment. Progress hep as tolerated; continue manual neuro Re-ed to increase AROM.

## 2020-03-05 ENCOUNTER — APPOINTMENT (OUTPATIENT)
Dept: PHYSICAL THERAPY | Facility: REHABILITATION | Age: 35
End: 2020-03-05
Attending: ORTHOPAEDIC SURGERY
Payer: MEDICAID

## 2020-03-10 ENCOUNTER — PHYSICAL THERAPY (OUTPATIENT)
Dept: PHYSICAL THERAPY | Facility: REHABILITATION | Age: 35
End: 2020-03-10
Attending: ORTHOPAEDIC SURGERY
Payer: MEDICAID

## 2020-03-10 DIAGNOSIS — M24.411 RECURRENT DISLOCATION OF RIGHT SHOULDER: ICD-10-CM

## 2020-03-10 DIAGNOSIS — M25.511 RIGHT SHOULDER PAIN, UNSPECIFIED CHRONICITY: ICD-10-CM

## 2020-03-10 PROCEDURE — 97110 THERAPEUTIC EXERCISES: CPT

## 2020-03-10 NOTE — OP THERAPY DAILY TREATMENT
Outpatient Physical Therapy  DAILY TREATMENT     [facility] Physical [address].  [address]  [place] [address]  Phone:  [phone]  Fax:  [phone]    Date: 03/10/2020    Patient: Jane Torrez  YOB: 1985  MRN: [id number]     Time Calculation  Start time: 1532  Stop time: 1600 Time Calculation (min): 28 minutes       Chief Complaint: Shoulder Problem    Visit #: 7    SUBJECTIVE:  I definitely do the exercises in the morning; I'm not so good at doing them during the afternoon. I can do my hair if I've stretched and done the exercises and I'm warm. If not, I have to flip my hair upside down. I snap my bra in the front and bring it to the back.     Goals:   Short Term Goals:   1. Pt will be independent with written HEP. (Partially met; pt reports partial compliance to hep)     Short term goal time span:  2-4 weeks      Long Term Goals:    1. Pt will be independent with written HEP.  2. Pt will have a QuickDash score improvement of 30% or greater (eval:34.09)  3. Pt will be able to demonstrate R GH AROM elevation within 15 deg of uninvolved shoulder in order to return to ADL's.  4. Pt will be able to demonstrate R GH ER within 15 deg of uninvolved shoulder in order to return to ADL's and self grooming/showering.      Long term goal time span:  6-8 weeks    OBJECTIVE:  Current objective measures:        GH AROM: Post-treatment  R GH flexion: 117  R GH abduction: 105 tightness   R GH ER: 37 at 0 deg GH abd         Therapeutic Exercises (CPT 19378):     1. UBE, x4 min, resistance level 3; alt every 30 sec fwd and bwd; level 2, warm up; fatigue after    6. Pulleys, flexion and IR emphasis 1x15 ea, Increased discomfort with GH IR AAROM; educated to perform gently to tolerance    9. Sleeper stretch, 2x15 with 2 sec hold    12. Wall walk abduction , 1x10, hep    13. PVC pass throughs, 1x10, hep; required pendulums for relief after exercise    14. PVC around the world,  1x10, hep    16. UPOC: 4/2/20      Therapeutic Exercise Summary:         Therapeutic Treatments and Modalities:     1. E Stim Unattended (CPT 24403), IFC and heat to R shoulder x 15 min    2. Neuromuscular Re-education (CPT 53461), see summary below, x9min    Therapeutic Treatment and Modalities Summary: Neuro re-ed:   GH PA's and inferior glides grade III with increasing GH elevation and ER with pt in supine.    L scapular mobs all directions Grade III-IV with pt R SL (pillow b/w knees for comfort) and pillow barrier between pt and therapist followed by L subscap release 2x10. Increasing scapular mobility              Time-based treatments/modalities:  Therapeutic exercise minutes (CPT 91533): 28 minutes     ASSESSMENT:   Response to treatment:   Pt is 10 weeks post R Shoulder anterior dislocation (12/26/19).   Continues to have R GH AROM limitations in all directions, increase in abduction today. Slow progression due to limited compliance with hep; pt admitting to performing AROM therex once daily despite repeated education to perform several times per day.        PLAN/RECOMMENDATIONS:   Plan for treatment: therapy treatment to continue next visit.  Planned interventions for next visit: continue with current treatment. Progress note for more sessions to continue with AROM and strengthening.

## 2020-03-12 ENCOUNTER — APPOINTMENT (OUTPATIENT)
Dept: PHYSICAL THERAPY | Facility: REHABILITATION | Age: 35
End: 2020-03-12
Attending: ORTHOPAEDIC SURGERY
Payer: MEDICAID

## 2020-03-16 ENCOUNTER — PHYSICAL THERAPY (OUTPATIENT)
Dept: PHYSICAL THERAPY | Facility: REHABILITATION | Age: 35
End: 2020-03-16
Attending: ORTHOPAEDIC SURGERY
Payer: MEDICAID

## 2020-03-16 DIAGNOSIS — M25.511 RIGHT SHOULDER PAIN, UNSPECIFIED CHRONICITY: ICD-10-CM

## 2020-03-16 DIAGNOSIS — M24.411 RECURRENT DISLOCATION OF RIGHT SHOULDER: ICD-10-CM

## 2020-03-16 PROCEDURE — 97110 THERAPEUTIC EXERCISES: CPT

## 2020-03-16 NOTE — OP THERAPY DAILY TREATMENT
Outpatient Physical Therapy  DAILY TREATMENT     [facility] Physical [address].  [address]  [place] [address]  Phone:  [phone]  Fax:  [phone]    Date: 03/16/2020    Patient: Jane Torrez  YOB: 1985  MRN: [id number]     Time Calculation  Start time: 1503  Stop time: 1536 Time Calculation (min): 33 minutes       Chief Complaint: Shoulder Problem    Visit #: 8    SUBJECTIVE:  I've been doing okay. I did some rowing at the gym and I was actually able to do it! I didn't feel too sore after. I notice that I still need to stretch before the shower so I can wash my hair.            Therapeutic Exercises (CPT 35770):     1. UBE, x4 min, resistance level 3; alt every 30 sec fwd and bwd; level 2, warm up; fatigue after    6. Pulleys, flexion and IR emphasis 1x15 ea, Increased discomfort with GH IR AAROM; educated to perform gently to tolerance    9. Sleeper stretch, 2x15 with 2 sec hold    10. Stargazers hooklying, 2x30 sec ea, hep    11. Push ups at counter, 10x, hep    12. Wall walk abduction , verbal review    13. PVC pass throughs, 1x10, hep; required pendulums for relief after exercise    14. PVC around the world, 1x10    15. Quadruped reaching, x10ea    19. UPOC: 4/2/20      Therapeutic Exercise Summary:         Therapeutic Treatments and Modalities:     1. E Stim Unattended (CPT 58541), IFC and heat to R shoulder x 15 min    2. Neuromuscular Re-education (CPT 01683), see summary below, x9min    Therapeutic Treatment and Modalities Summary: Neuro re-ed:   GH PA's and inferior glides grade III with increasing GH elevation and ER with pt in supine.    L scapular mobs all directions Grade III-IV with pt R SL (pillow b/w knees for comfort) and pillow barrier between pt and therapist followed by L subscap release 2x10. Increasing scapular mobility              Time-based treatments/modalities:  Therapeutic exercise minutes (CPT 08165): 33 minutes     ASSESSMENT:    Response to treatment:   See progress note.       PLAN/RECOMMENDATIONS:   Plan for treatment: therapy treatment to continue next visit.  Planned interventions for next visit: continue with current treatment. See progress note.

## 2020-03-16 NOTE — OP THERAPY PROGRESS SUMMARY
"  Outpatient Physical Therapy  PROGRESS SUMMARY NOTE      [facility] Physical Therapy [address].  [address]  [place] [address]  Phone:  [phone]  Fax:  [phone]    Date of Visit: 03/16/2020    Patient: Jane Torrez  YOB: 1985  MRN: [id number]     Referring Provider: Zachary Arias M.D.  [address] [place] [address]   Referring Diagnosis Recurrent dislocation, right shoulder [M24.411];Pain in right shoulder [M25.511]     Visit Diagnoses     ICD-10-CM   1. Recurrent dislocation of right shoulder M24.411   2. Right shoulder pain, unspecified chronicity M25.511       Rehab Potential: good    Physical Therapy Occurrence Codes    Date of onset of impairment:  12/26/19   Date physical therapy care plan established or reviewed:  1/30/20   Date physical therapy treatment started:  1/30/20          Cert Period: 3/16/20 to 5/22/20  Progress Report Period: 1/30/20-3/16/20    Functional Assessment Used  Quickdash General Total Score: 9.09       Objective Findings and Assessment:   Patient progression towards goals: Pt has attended 8 visits of physical therapy and reports 75% improvement since initiating physical therapy. Physical therapy treatment consisted of: Manual therapy, scapular stabilization, pt education, and GH AROM. Pt reports improvements in: AROM, self care/showering, ability to return to gym. Pt reports continued limitations with: L GH AROM, stiffness, limited strength \"I can't hold a plank anymore.\"    Pt has made great improvements in therapy today including increased R GH AROM, improved ADL's such as improved bathing and self care. Pt continuing to have limitations in AROM and lacking scapular stability and RC strength. Pt may continue to benefit from continued mobility exercises in addition to strengthening to return to ADL's, hobbies in order to improve QOL.        Objective findings and assessment details: Quickdash General Total Score: 9.09 Today (At " eval: 34.09)    GH AROM: Post-treatment in degrees Today  R GH flexion: 120  R GH abduction: 110 *tightness   R GH ER: 40 at 0 deg GH abd     Right Shoulder at Eval  Flexion: 75 (Pain R shoulder into R upper arm) degrees with pain  External rotation 0°: 20 (R shoulder pain to proximal R elbow) degrees with pain    Goals:   Short Term Goals:   Goals:   Short Term Goals:   1. Pt will be independent with written HEP. (Partially met; pt reports partial compliance to hep; improving per pt)        Short term goal time span:  2-4 weeks      Long Term Goals:    Long Term Goals:    1. Pt will be independent with written HEP. (Met)  2. Pt will have a QuickDash score improvement of 30% or greater (eval:34.09) (Met)  3. Pt will be able to demonstrate R GH AROM elevation within 15 deg of uninvolved shoulder in order to return to ADL's. (Ongoing goal)  4. Pt will be able to demonstrate R GH ER within 15 deg of uninvolved shoulder in order to return to ADL's and self grooming/showering.  (Ongoing goal)       Long term goal time span:  6-8 weeks    Plan:   Planned therapy interventions:  Neuromuscular Re-education (CPT 10029) and Therapeutic Exercise (CPT 32186)  Frequency:  1x week  Duration in visits:  6  Plan details:  UPOC: 5/22/20    2x97110 and 2x97112 per visit for 5-6 more sessions of PT (1x/wk)        Referring provider co-signature:  I have reviewed this plan of care and my co-signature certifies the need for services.    Physician Signature: ________________________________ Date: ______________

## 2020-03-19 ENCOUNTER — APPOINTMENT (OUTPATIENT)
Dept: PHYSICAL THERAPY | Facility: REHABILITATION | Age: 35
End: 2020-03-19
Attending: ORTHOPAEDIC SURGERY
Payer: MEDICAID

## 2020-03-23 ENCOUNTER — PHYSICAL THERAPY (OUTPATIENT)
Dept: PHYSICAL THERAPY | Facility: REHABILITATION | Age: 35
End: 2020-03-23
Attending: ORTHOPAEDIC SURGERY
Payer: MEDICAID

## 2020-03-23 DIAGNOSIS — M25.511 RIGHT SHOULDER PAIN, UNSPECIFIED CHRONICITY: ICD-10-CM

## 2020-03-23 DIAGNOSIS — M24.411 RECURRENT DISLOCATION OF RIGHT SHOULDER: ICD-10-CM

## 2020-03-23 PROCEDURE — 97110 THERAPEUTIC EXERCISES: CPT

## 2020-03-23 PROCEDURE — 97112 NEUROMUSCULAR REEDUCATION: CPT

## 2020-03-23 NOTE — OP THERAPY DAILY TREATMENT
"  Outpatient Physical Therapy  DAILY TREATMENT     [facility] Physical [address].  [address]  [place] [address]  Phone:  [phone]  Fax:  [phone]    Date: 03/23/2020    Patient: Jane Torrez  YOB: 1985  MRN: [id number]     Time Calculation  Start time: 1501  Stop time: 1533 Time Calculation (min): 32 minutes       Chief Complaint: Shoulder Problem    Visit #: 9    SUBJECTIVE:  I've been doing okay. I did some rowing at the gym and I was actually able to do it! I didn't feel too sore after. I notice that I still need to stretch before the shower so I can wash my hair.      Objective:  Shoulder AROM:  R flexion: 123 deg   R ER (0 deg abd): 47 deg  R abduction: 118 deg  R GH IR: T7      Therapeutic Exercises (CPT 65147):     1. UBE, x4 min, resistance level 3; alt every 30 sec fwd and bwd; level 2, warm up; fatigue after    6. Pulleys, flexion and IR emphasis 1x15 ea, Increased discomfort with GH IR AAROM; educated to perform gently to tolerance    9. Sleeper stretch, 2x15 with 2 sec hold    10. Stargazers hooklying, 2x30 sec ea, hep    11. Push ups at counter, 10x, hep    12. Wall walk abduction , verbal review    13. PVC pass throughs, 1x10, no longer requires pendulums for relief; increased GH AROM and coordination with movement    14. PVC around the world, 1x10, increased AROM and coordination with movement    15. Quadruped reaching, x10ea, foam pad on back to emphasis neutral spine    16. Quadruped reaching , 10x with , orange TB pull; increased GH AROM - however also increased rotation of upper trunk during movement; \"this is easier\"    19. UPOC: 4/2/20      Therapeutic Exercise Summary:         Therapeutic Treatments and Modalities:     2. Neuromuscular Re-education (CPT 64099), see summary below, x9min    Therapeutic Treatment and Modalities Summary: Neuro re-ed:   L scapular mobs all directions Grade III-IV with pt R SL (pillow b/w knees for comfort) " and pillow barrier between pt and therapist followed by L subscap release 2x10 followed by GH PA's and inferior glides grade III with increasing GH elevation and ER with pt in supine; intermittent GH distraction provided. Increasing scapular and GH mobility noted.             Time-based treatments/modalities:  Therapeutic exercise minutes (CPT 44117): 23 minutes  Neuromusc re-ed, balance, coor, post minutes (CPT 53041): 9 minutes     ASSESSMENT:   Response to treatment:   Pt demonstrating sig increases GH today. Increased coordination and GH AROM with PVC pipe and stretches seen in clinic. Increased AROM observed with quadruped reaching with added resistance from orange TB; orange TB pull;  However some increased rotation of upper trunk during movement. Pt currently approved for 6 more sessions from medicaid.       PLAN/RECOMMENDATIONS:   Plan for treatment: therapy treatment to continue next visit.  Planned interventions for next visit: continue with current treatment.  Continue with manual, stretches and PVC. Review quadruped band reaches.

## 2020-03-30 ENCOUNTER — APPOINTMENT (OUTPATIENT)
Dept: PHYSICAL THERAPY | Facility: REHABILITATION | Age: 35
End: 2020-03-30
Attending: ORTHOPAEDIC SURGERY
Payer: MEDICAID

## 2020-03-30 NOTE — OP THERAPY DAILY TREATMENT
"  Outpatient Physical Therapy  DAILY TREATMENT     [facility] Physical [address].  [address]  [place] [address]  Phone:  [phone]  Fax:  [phone]    Date: 03/30/2020    Patient: Jane Torrez  YOB: 1985  MRN: [id number]     Time Calculation               Chief Complaint: No chief complaint on file.    Visit #: 10    SUBJECTIVE:  I've been doing okay. I did some rowing at the gym and I was actually able to do it! I didn't feel too sore after. I notice that I still need to stretch before the shower so I can wash my hair.      Objective:  Shoulder AROM:  R flexion: 123 deg   R ER (0 deg abd): 47 deg  R abduction: 118 deg  R GH IR: T7      Therapeutic Exercises (CPT 37348):     1. UBE, x4 min, resistance level 3; alt every 30 sec fwd and bwd; level 2, warm up; fatigue after    6. Pulleys, flexion and IR emphasis 1x15 ea, Increased discomfort with GH IR AAROM; educated to perform gently to tolerance    9. Sleeper stretch, 2x15 with 2 sec hold    10. Stargazers hooklying, 2x30 sec ea, hep    11. Push ups at counter, 10x, hep    12. Wall walk abduction , verbal review    13. PVC pass throughs, 1x10, no longer requires pendulums for relief; increased GH AROM and coordination with movement    14. PVC around the world, 1x10, increased AROM and coordination with movement    15. Quadruped reaching, x10ea, foam pad on back to emphasis neutral spine    16. Quadruped reaching , 10x with , orange TB pull; increased GH AROM - however also increased rotation of upper trunk during movement; \"this is easier\"    17. ER at wall    18. Prone angels    20. UPOC: 5/22/20      Therapeutic Exercise Summary:         Therapeutic Treatments and Modalities:     2. Neuromuscular Re-education (CPT 63701), see summary below, x9min    Therapeutic Treatment and Modalities Summary: Neuro re-ed:   L scapular mobs all directions Grade III-IV with pt R SL (pillow b/w knees for comfort) and pillow " barrier between pt and therapist followed by L subscap release 2x10 followed by GH PA's and inferior glides grade III with increasing GH elevation and ER with pt in supine; intermittent GH distraction provided. Increasing scapular and GH mobility noted.             Time-based treatments/modalities:        ASSESSMENT:   Response to treatment:   Pt demonstrating sig increases GH today. Increased coordination and GH AROM with PVC pipe and stretches seen in clinic. Increased AROM observed with quadruped reaching with added resistance from orange TB; orange TB pull;  However some increased rotation of upper trunk during movement. Pt currently approved for 6 more sessions from medicaid.       PLAN/RECOMMENDATIONS:   Plan for treatment: therapy treatment to continue next visit.  Planned interventions for next visit: continue with current treatment.  Continue with manual, stretches and PVC. Review quadruped band reaches.

## 2020-04-10 ENCOUNTER — PHYSICAL THERAPY (OUTPATIENT)
Dept: PHYSICAL THERAPY | Facility: REHABILITATION | Age: 35
End: 2020-04-10
Attending: ORTHOPAEDIC SURGERY
Payer: MEDICAID

## 2020-04-10 DIAGNOSIS — M25.511 RIGHT SHOULDER PAIN, UNSPECIFIED CHRONICITY: ICD-10-CM

## 2020-04-10 DIAGNOSIS — M24.411 RECURRENT DISLOCATION OF RIGHT SHOULDER: ICD-10-CM

## 2020-04-10 PROCEDURE — 97110 THERAPEUTIC EXERCISES: CPT

## 2020-04-10 NOTE — OP THERAPY DAILY TREATMENT
"  Outpatient Physical Therapy  DAILY TREATMENT     [facility] Physical [address].  [address]  [place] [address]  Phone:  [phone]  Fax:  [phone]    Date: 04/10/2020    Patient: Jane Torrez  YOB: 1985  MRN: [id number]     Time Calculation  Start time: 1503  Stop time: 1530 Time Calculation (min): 27 minutes       Chief Complaint: Shoulder Problem    Visit #: 10    SUBJECTIVE:  I'm feeling 85-90% improved. I feel like it will get to 100% with my HEP.     OBJECTIVE:  Quickdash General Total Score: 4.55       Therapeutic Exercises (CPT 03126):     1. UBE, x4 min, resistance level 3; alt every 30 sec fwd and bwd; level 2, warm up; fatigue after    6. Pulleys, flexion and IR emphasis 1x15 ea, Increased discomfort with GH IR AAROM; educated to perform gently to tolerance    9. Sleeper stretch, 2x15 with 2 sec hold    10. Stargazers hooklying, 2x30 sec ea, review for HEP    11. Doorway pec str, 3 heights x 30\" ea, HEP    13. PVC pass throughs, 1x10    14. PVC around the world, 1x10      Time-based treatments/modalities:  Therapeutic exercise minutes (CPT 23125): 27 minutes       ASSESSMENT:   Response to treatment: See d/c note    PLAN/RECOMMENDATIONS:   Plan for treatment: no further treatment needed.         "

## 2020-04-10 NOTE — OP THERAPY DISCHARGE SUMMARY
Outpatient Physical Therapy  DISCHARGE SUMMARY NOTE      [facility] Physical Therapy [address]  [address]  [place] [address]  Phone:  [phone]  Fax:  [phone]    Date of Visit: 04/10/2020    Patient: Jane Torrez  YOB: 1985  MRN: [id number]     Referring Provider: Zachary Arias M.D.  [address] [place] [address]   Referring Diagnosis Recurrent dislocation, right shoulder [M24.411];Pain in right shoulder [M25.511]     Physical Therapy Occurrence Codes    Date of onset of impairment:  12/26/19   Date physical therapy care plan established or reviewed:  1/30/20   Date physical therapy treatment started:  1/30/20          Functional Assessment Used  Quickdash General Total Score: 4.55     Your patient is being discharged from Physical Therapy with the following comments:   · Goals met    Comments:  Ms. Torrez was seen for 10 PT visits treating her R shoulder.  Re-eval today shows good progress overall.  She continues to have capsular restrictions with PROM: ff= 140, abd= 140, ER=70, IR= 60 deg; however, her strength is full and painfree, allowing control of the full available ROM actively.  Pt has resulting low level of disability reported (5%) and states there is no limit in her work or home activity.  She is compliant with HEP, which should resolve the remaining capsular restrictions over time.       Recommendations:  D/C to HEP.    Kaylah Jain, PT, DPT    Date: 4/10/2020

## 2020-04-13 ENCOUNTER — APPOINTMENT (OUTPATIENT)
Dept: PHYSICAL THERAPY | Facility: REHABILITATION | Age: 35
End: 2020-04-13
Attending: ORTHOPAEDIC SURGERY
Payer: MEDICAID

## 2020-04-20 ENCOUNTER — APPOINTMENT (OUTPATIENT)
Dept: PHYSICAL THERAPY | Facility: REHABILITATION | Age: 35
End: 2020-04-20
Attending: ORTHOPAEDIC SURGERY
Payer: MEDICAID

## 2020-04-27 ENCOUNTER — APPOINTMENT (OUTPATIENT)
Dept: PHYSICAL THERAPY | Facility: REHABILITATION | Age: 35
End: 2020-04-27
Attending: ORTHOPAEDIC SURGERY
Payer: MEDICAID

## 2020-05-04 ENCOUNTER — APPOINTMENT (OUTPATIENT)
Dept: PHYSICAL THERAPY | Facility: REHABILITATION | Age: 35
End: 2020-05-04
Attending: ORTHOPAEDIC SURGERY
Payer: MEDICAID

## 2020-11-02 ENCOUNTER — TELEPHONE (OUTPATIENT)
Dept: MEDICAL GROUP | Facility: MEDICAL CENTER | Age: 35
End: 2020-11-02

## 2020-11-02 DIAGNOSIS — Z91.89 AT INCREASED RISK OF EXPOSURE TO COVID-19 VIRUS: ICD-10-CM

## 2020-11-02 NOTE — TELEPHONE ENCOUNTER
Patient came in to the office to request Covid testing. Son and herself currently have symptoms. Son is getting tested today and would like to get an order for testing as well.     Please advise.     Thank you,     Delmy Cruz  Medical Assistant

## 2020-11-03 ENCOUNTER — HOSPITAL ENCOUNTER (OUTPATIENT)
Dept: LAB | Facility: MEDICAL CENTER | Age: 35
End: 2020-11-03
Attending: NURSE PRACTITIONER
Payer: MEDICAID

## 2020-11-03 DIAGNOSIS — Z91.89 AT INCREASED RISK OF EXPOSURE TO COVID-19 VIRUS: ICD-10-CM

## 2020-11-03 PROCEDURE — C9803 HOPD COVID-19 SPEC COLLECT: HCPCS

## 2020-11-03 PROCEDURE — U0003 INFECTIOUS AGENT DETECTION BY NUCLEIC ACID (DNA OR RNA); SEVERE ACUTE RESPIRATORY SYNDROME CORONAVIRUS 2 (SARS-COV-2) (CORONAVIRUS DISEASE [COVID-19]), AMPLIFIED PROBE TECHNIQUE, MAKING USE OF HIGH THROUGHPUT TECHNOLOGIES AS DESCRIBED BY CMS-2020-01-R: HCPCS

## 2020-11-04 LAB — COVID ORDER STATUS COVID19: NORMAL

## 2020-11-05 LAB
SARS-COV-2 RNA RESP QL NAA+PROBE: DETECTED
SPECIMEN SOURCE: ABNORMAL

## 2020-11-05 NOTE — RESULT ENCOUNTER NOTE
Sen Lara-        I got your lab results.  Your covid test was positive.  If you develop difficultly breathing, please go to the ER.  We recommend self isolation and supportive care- fluids and tylenol to control your fever.  Please let me know if you have any questions or concerns.  Sophie

## 2020-11-12 ENCOUNTER — PATIENT MESSAGE (OUTPATIENT)
Dept: MEDICAL GROUP | Facility: MEDICAL CENTER | Age: 35
End: 2020-11-12

## 2020-11-12 DIAGNOSIS — Z11.52 ENCOUNTER FOR SCREENING LABORATORY TESTING FOR COVID-19 VIRUS: ICD-10-CM

## 2021-08-19 ENCOUNTER — NON-PROVIDER VISIT (OUTPATIENT)
Dept: OCCUPATIONAL MEDICINE | Facility: CLINIC | Age: 36
End: 2021-08-19

## 2021-08-19 DIAGNOSIS — Z02.1 PRE-EMPLOYMENT HEALTH SCREENING EXAMINATION: ICD-10-CM

## 2021-08-19 PROCEDURE — 8279 PR URINE 6 PANEL - SEND TO LAB: Performed by: NURSE PRACTITIONER

## 2022-01-03 DIAGNOSIS — Z11.52 ENCOUNTER FOR SCREENING LABORATORY TESTING FOR COVID-19 VIRUS: ICD-10-CM

## 2022-01-11 ENCOUNTER — HOSPITAL ENCOUNTER (OUTPATIENT)
Dept: LAB | Facility: MEDICAL CENTER | Age: 37
End: 2022-01-11
Attending: PHYSICIAN ASSISTANT
Payer: COMMERCIAL

## 2022-01-11 DIAGNOSIS — Z11.52 ENCOUNTER FOR SCREENING LABORATORY TESTING FOR COVID-19 VIRUS: ICD-10-CM

## 2022-01-11 LAB — COVID ORDER STATUS COVID19: NORMAL

## 2022-01-11 PROCEDURE — U0003 INFECTIOUS AGENT DETECTION BY NUCLEIC ACID (DNA OR RNA); SEVERE ACUTE RESPIRATORY SYNDROME CORONAVIRUS 2 (SARS-COV-2) (CORONAVIRUS DISEASE [COVID-19]), AMPLIFIED PROBE TECHNIQUE, MAKING USE OF HIGH THROUGHPUT TECHNOLOGIES AS DESCRIBED BY CMS-2020-01-R: HCPCS

## 2022-01-11 PROCEDURE — U0005 INFEC AGEN DETEC AMPLI PROBE: HCPCS

## 2022-01-11 PROCEDURE — C9803 HOPD COVID-19 SPEC COLLECT: HCPCS

## 2022-01-12 LAB
SARS-COV-2 RNA RESP QL NAA+PROBE: NOTDETECTED
SPECIMEN SOURCE: NORMAL

## 2022-10-18 ENCOUNTER — NON-PROVIDER VISIT (OUTPATIENT)
Dept: URGENT CARE | Facility: PHYSICIAN GROUP | Age: 37
End: 2022-10-18

## 2022-10-18 DIAGNOSIS — Z02.1 PRE-EMPLOYMENT DRUG SCREENING: ICD-10-CM

## 2022-10-18 LAB
AMP AMPHETAMINE: NEGATIVE
BREATH ALCOHOL COMMENT: ABNORMAL
BREATH ALCOHOL COMMENT: NORMAL
COC COCAINE: POSITIVE
INT CON NEG: NORMAL
INT CON POS: NORMAL
MET METHAMPHETAMINES: NEGATIVE
OPI OPIATES: NEGATIVE
PCP PHENCYCLIDINE: NEGATIVE
POC BREATHALIZER: 0 PERCENT (ref 0–0.01)
POC BREATHALIZER: 0.02 PERCENT (ref 0–0.01)
POC DRUG COMMENT 753798-OCCUPATIONAL HEALTH: NORMAL
THC: POSITIVE

## 2022-10-18 PROCEDURE — 82075 ASSAY OF BREATH ETHANOL: CPT | Performed by: STUDENT IN AN ORGANIZED HEALTH CARE EDUCATION/TRAINING PROGRAM

## 2022-10-18 PROCEDURE — 80305 DRUG TEST PRSMV DIR OPT OBS: CPT | Performed by: STUDENT IN AN ORGANIZED HEALTH CARE EDUCATION/TRAINING PROGRAM

## 2022-11-01 ENCOUNTER — EH NON-PROVIDER (OUTPATIENT)
Dept: OCCUPATIONAL MEDICINE | Facility: CLINIC | Age: 37
End: 2022-11-01

## 2022-11-01 DIAGNOSIS — Z02.83 ENCOUNTER FOR DRUG SCREENING: ICD-10-CM

## 2022-11-01 PROCEDURE — 8911 PR MRO FEE: Performed by: NURSE PRACTITIONER

## 2022-11-25 ENCOUNTER — OFFICE VISIT (OUTPATIENT)
Dept: URGENT CARE | Facility: CLINIC | Age: 37
End: 2022-11-25
Payer: COMMERCIAL

## 2022-11-25 VITALS
OXYGEN SATURATION: 96 % | SYSTOLIC BLOOD PRESSURE: 124 MMHG | TEMPERATURE: 98.6 F | RESPIRATION RATE: 16 BRPM | WEIGHT: 220 LBS | BODY MASS INDEX: 31.5 KG/M2 | DIASTOLIC BLOOD PRESSURE: 78 MMHG | HEIGHT: 70 IN | HEART RATE: 102 BPM

## 2022-11-25 DIAGNOSIS — R05.9 COUGH IN ADULT PATIENT: ICD-10-CM

## 2022-11-25 DIAGNOSIS — R05.9 COUGH IN ADULT: ICD-10-CM

## 2022-11-25 DIAGNOSIS — J39.2 ERYTHEMA OF PHARYNX: ICD-10-CM

## 2022-11-25 LAB
INT CON NEG: NORMAL
INT CON POS: NORMAL
S PYO AG THROAT QL: NEGATIVE

## 2022-11-25 PROCEDURE — 87880 STREP A ASSAY W/OPTIC: CPT | Performed by: NURSE PRACTITIONER

## 2022-11-25 PROCEDURE — 99213 OFFICE O/P EST LOW 20 MIN: CPT | Performed by: NURSE PRACTITIONER

## 2022-11-25 RX ORDER — BENZONATATE 200 MG/1
200 CAPSULE ORAL 3 TIMES DAILY PRN
Qty: 60 CAPSULE | Refills: 0 | Status: SHIPPED | OUTPATIENT
Start: 2022-11-25

## 2022-11-25 RX ORDER — DEXTROMETHORPHAN HYDROBROMIDE AND PROMETHAZINE HYDROCHLORIDE 15; 6.25 MG/5ML; MG/5ML
5 SYRUP ORAL EVERY 4 HOURS PRN
Qty: 120 ML | Refills: 0 | Status: SHIPPED | OUTPATIENT
Start: 2022-11-25 | End: 2022-12-02

## 2022-11-25 ASSESSMENT — ENCOUNTER SYMPTOMS
VOMITING: 0
MYALGIAS: 0
NAUSEA: 0
ABDOMINAL PAIN: 0
WHEEZING: 0
HEADACHES: 0
SORE THROAT: 0
COUGH: 1
DIARRHEA: 0
FEVER: 0

## 2022-11-25 NOTE — PROGRESS NOTES
Subjective:     Jane Torrez is a 37 y.o. female who presents for Cough (X 3 days, cough. negative home covid test yesterday.)      Cough  This is a new problem. The current episode started in the past 7 days (Jane is a pleasant 37 year old female who presents to  today with comlaints of a non productive cough for the past 3 days.). Pertinent negatives include no fever, headaches, myalgias, nasal congestion, sore throat or wheezing. Treatments tried: OTC cough syrup. The treatment provided no relief.       Review of Systems   Constitutional:  Negative for fever and malaise/fatigue.   HENT:  Negative for congestion and sore throat.    Respiratory:  Positive for cough. Negative for wheezing.    Gastrointestinal:  Negative for abdominal pain, diarrhea, nausea and vomiting.   Musculoskeletal:  Negative for myalgias.   Neurological:  Negative for headaches.     PMH:   Past Medical History:   Diagnosis Date    No known health problems      ALLERGIES:   Allergies   Allergen Reactions    Nkda [No Known Drug Allergy]      SURGHX:   Past Surgical History:   Procedure Laterality Date    ORIF, FINGER Right 9/1/2015    Procedure: FINGER ORIF FOURTH FINGER @ PIP JOINT;  Surgeon: Gómez Soto M.D.;  Location: SURGERY [facility];  Service:     LIGAMENT REPAIR Right 9/1/2015    Procedure: LIGAMENT REPAIR;  Surgeon: Gómez Soto M.D.;  Location: SURGERY [facility];  Service:     TONSILLECTOMY  1987     SOCHX:   Social History     Socioeconomic History    Marital status:    Tobacco Use    Smoking status: Never    Smokeless tobacco: Never   Vaping Use    Vaping Use: Never used   Substance and Sexual Activity    Alcohol use: Yes     Alcohol/week: 0.0 oz     Comment: 3-4 per week    Drug use: Not Currently     Comment: in the past crystal, clean since 10/6/10    Sexual activity: Yes     Partners: Male     FH:   Family History   Problem Relation Age of Onset    Hyperlipidemia Father     Other  "Mother         macular degeneration    Alzheimer's Disease Paternal Grandmother     Heart Disease Paternal Grandmother          Objective:   /78   Pulse (!) 102   Temp 37 °C (98.6 °F) (Temporal)   Resp 16   Ht 1.778 m (5' 10\")   Wt 99.8 kg (220 lb)   SpO2 96%   BMI 31.57 kg/m²     Physical Exam  Vitals and nursing note reviewed.   Constitutional:       General: She is not in acute distress.     Appearance: Normal appearance. She is normal weight. She is not ill-appearing.   HENT:      Head: Normocephalic and atraumatic.      Right Ear: Tympanic membrane, ear canal and external ear normal. There is no impacted cerumen.      Left Ear: Tympanic membrane, ear canal and external ear normal. There is no impacted cerumen.      Nose: No congestion or rhinorrhea.      Mouth/Throat:      Mouth: Mucous membranes are moist.      Pharynx: Uvula midline. Posterior oropharyngeal erythema present. No pharyngeal swelling, oropharyngeal exudate or uvula swelling.      Tonsils: No tonsillar exudate or tonsillar abscesses. 1+ on the right. 1+ on the left.   Eyes:      Extraocular Movements: Extraocular movements intact.      Pupils: Pupils are equal, round, and reactive to light.   Cardiovascular:      Rate and Rhythm: Normal rate and regular rhythm.      Pulses: Normal pulses.      Heart sounds: Normal heart sounds.   Pulmonary:      Effort: Pulmonary effort is normal. No respiratory distress.      Breath sounds: Normal breath sounds. No stridor. No wheezing, rhonchi or rales.   Chest:      Chest wall: No tenderness.   Abdominal:      General: Abdomen is flat. Bowel sounds are normal.      Palpations: Abdomen is soft.      Tenderness: There is no abdominal tenderness. There is no right CVA tenderness or left CVA tenderness.   Musculoskeletal:         General: Normal range of motion.      Cervical back: Normal range of motion and neck supple. No tenderness.   Lymphadenopathy:      Cervical: No cervical adenopathy. "   Skin:     General: Skin is warm and dry.      Capillary Refill: Capillary refill takes less than 2 seconds.   Neurological:      General: No focal deficit present.      Mental Status: She is alert and oriented to person, place, and time. Mental status is at baseline.   Psychiatric:         Mood and Affect: Mood normal.         Behavior: Behavior normal.         Thought Content: Thought content normal.         Judgment: Judgment normal.     POCT strep: negative   Assessment/Plan:   Assessment    1. Cough in adult  POCT Rapid Strep A      2. Erythema of pharynx  POCT Rapid Strep A      3. Cough in adult patient  promethazine-dextromethorphan (PROMETHAZINE-DM) 6.25-15 MG/5ML syrup    benzonatate (TESSALON) 200 MG capsule      Differential diagnoses discussed.   We discussed supportive measures including humidifier, warm salt water gargles, over-the-counter Cepacol throat lozenges, rest  and increased fluids. Pt was encouraged to seek treatment back in the ER or urgent care for worsening symptoms,  fever greater than 100.5, wheezes or shortness of breath.    AVS handout given and reviewed with patient. Pt educated on red flags and when to seek treatment back in ER or UC.

## 2025-05-19 ENCOUNTER — OFFICE VISIT (OUTPATIENT)
Dept: URGENT CARE | Facility: CLINIC | Age: 40
End: 2025-05-19
Payer: MEDICAID

## 2025-05-19 ENCOUNTER — APPOINTMENT (OUTPATIENT)
Dept: RADIOLOGY | Facility: IMAGING CENTER | Age: 40
End: 2025-05-19
Attending: NURSE PRACTITIONER
Payer: MEDICAID

## 2025-05-19 DIAGNOSIS — R06.2 WHEEZING: ICD-10-CM

## 2025-05-19 DIAGNOSIS — R05.2 SUBACUTE COUGH: Primary | ICD-10-CM

## 2025-05-19 DIAGNOSIS — J22 LOWER RESPIRATORY INFECTION (E.G., BRONCHITIS, PNEUMONIA, PNEUMONITIS, PULMONITIS): ICD-10-CM

## 2025-05-19 DIAGNOSIS — R05.2 SUBACUTE COUGH: ICD-10-CM

## 2025-05-19 PROCEDURE — 71046 X-RAY EXAM CHEST 2 VIEWS: CPT | Mod: TC,FY | Performed by: RADIOLOGY

## 2025-05-19 PROCEDURE — 99214 OFFICE O/P EST MOD 30 MIN: CPT | Mod: 25 | Performed by: NURSE PRACTITIONER

## 2025-05-19 PROCEDURE — 94640 AIRWAY INHALATION TREATMENT: CPT | Performed by: NURSE PRACTITIONER

## 2025-05-19 RX ORDER — ALBUTEROL SULFATE 0.83 MG/ML
2.5 SOLUTION RESPIRATORY (INHALATION) ONCE
Status: COMPLETED | OUTPATIENT
Start: 2025-05-19 | End: 2025-05-19

## 2025-05-19 RX ORDER — METHYLPREDNISOLONE 4 MG/1
TABLET ORAL
Qty: 21 TABLET | Refills: 0 | Status: SHIPPED | OUTPATIENT
Start: 2025-05-19

## 2025-05-19 RX ORDER — ALBUTEROL SULFATE 90 UG/1
1-2 INHALANT RESPIRATORY (INHALATION) EVERY 4 HOURS PRN
Qty: 8.5 G | Refills: 0 | Status: SHIPPED | OUTPATIENT
Start: 2025-05-19

## 2025-05-19 RX ORDER — AZITHROMYCIN 250 MG/1
250 TABLET, FILM COATED ORAL DAILY
Qty: 6 TABLET | Refills: 0 | Status: SHIPPED | OUTPATIENT
Start: 2025-05-19 | End: 2025-05-24

## 2025-05-19 RX ORDER — BENZONATATE 100 MG/1
100 CAPSULE ORAL 3 TIMES DAILY PRN
Qty: 30 CAPSULE | Refills: 0 | Status: SHIPPED | OUTPATIENT
Start: 2025-05-19

## 2025-05-19 RX ADMIN — ALBUTEROL SULFATE 2.5 MG: 0.83 SOLUTION RESPIRATORY (INHALATION) at 18:16

## 2025-05-19 ASSESSMENT — ENCOUNTER SYMPTOMS
HEARTBURN: 1
SORE THROAT: 0
WHEEZING: 0
COUGH: 1
FEVER: 1
VOMITING: 1
SHORTNESS OF BREATH: 0

## 2025-05-19 NOTE — PROGRESS NOTES
Subjective:     Jane Torrez is a 39 y.o. female who presents for Cough (On going 1 month Sx Throwing up mucus )      Stated the cough may have started with allegies. Had a fever last Tuesday.  Congestion developed this week. Hx of prolonged cough.    Cough  This is a new problem. The current episode started more than 1 month ago. Associated symptoms include a fever, heartburn and nasal congestion. Pertinent negatives include no chest pain, sore throat, shortness of breath or wheezing. There is no history of asthma.       Past Medical History[1]    Past Surgical History[2]    Social History     Socioeconomic History    Marital status:      Spouse name: Not on file    Number of children: Not on file    Years of education: Not on file    Highest education level: Not on file   Occupational History    Not on file   Tobacco Use    Smoking status: Never    Smokeless tobacco: Never   Vaping Use    Vaping status: Never Used   Substance and Sexual Activity    Alcohol use: Yes     Alcohol/week: 0.0 oz     Comment: 3-4 per week    Drug use: Not Currently     Comment: in the past crystal, clean since 10/6/10    Sexual activity: Yes     Partners: Male   Other Topics Concern    Not on file   Social History Narrative    Not on file     Social Drivers of Health     Financial Resource Strain: Not on file   Food Insecurity: Not on file   Transportation Needs: Not on file   Physical Activity: Not on file   Stress: Not on file   Social Connections: Not on file   Intimate Partner Violence: Not on file   Housing Stability: Not on file        Family History   Problem Relation Age of Onset    Hyperlipidemia Father     Other Mother         macular degeneration    Alzheimer's Disease Paternal Grandmother     Heart Disease Paternal Grandmother         Allergies[3]    Review of Systems   Constitutional:  Positive for fever.   HENT:  Positive for congestion. Negative for sore throat.    Respiratory:  Positive for cough.  "Negative for shortness of breath and wheezing.    Cardiovascular:  Negative for chest pain and leg swelling.   Gastrointestinal:  Positive for heartburn and vomiting.   All other systems reviewed and are negative.       Objective:   /82 (BP Location: Left arm, Patient Position: Sitting, BP Cuff Size: Adult)   Pulse (!) 105   Temp 36.9 °C (98.5 °F) (Temporal)   Resp 18   Ht 1.778 m (5' 10\")   Wt 113 kg (250 lb 3.2 oz)   SpO2 99%   BMI 35.90 kg/m²     Physical Exam  Vitals reviewed.   Constitutional:       General: She is not in acute distress.     Appearance: She is well-developed. She is not toxic-appearing.   HENT:      Head: Normocephalic and atraumatic.      Right Ear: Ear canal and external ear normal.      Left Ear: Ear canal and external ear normal.      Nose: Congestion present.      Mouth/Throat:      Mouth: Mucous membranes are moist.      Pharynx: Posterior oropharyngeal erythema present.   Eyes:      Conjunctiva/sclera: Conjunctivae normal.   Cardiovascular:      Rate and Rhythm: Regular rhythm. Tachycardia present.   Pulmonary:      Effort: Pulmonary effort is normal. No respiratory distress.      Breath sounds: No stridor. Wheezing present. No rhonchi or rales.   Skin:     General: Skin is warm and dry.      Findings: No rash.   Neurological:      Mental Status: She is alert and oriented to person, place, and time.      GCS: GCS eye subscore is 4. GCS verbal subscore is 5. GCS motor subscore is 6.   Psychiatric:         Speech: Speech normal.         Assessment/Plan:   1. Lower respiratory infection (e.g., bronchitis, pneumonia, pneumonitis, pulmonitis)  - DX-CHEST-2 VIEWS; Future  - albuterol (Proventil) 2.5mg/3ml nebulizer solution 2.5 mg  - methylPREDNISolone (MEDROL DOSEPAK) 4 MG Tablet Therapy Pack; Follow schedule on package instructions.  Dispense: 21 Tablet; Refill: 0  - Referral to establish with PCP  - albuterol 108 (90 Base) MCG/ACT Aero Soln inhalation aerosol; Inhale 1-2 Puffs " every four hours as needed for Shortness of Breath.  Dispense: 8.5 g; Refill: 0  - benzonatate (TESSALON) 100 MG Cap; Take 1 Capsule by mouth 3 times a day as needed for Cough.  Dispense: 30 Capsule; Refill: 0  - azithromycin (ZITHROMAX) 250 MG Tab; Take 1 Tablet by mouth every day for 5 days.  Dispense: 6 Tablet; Refill: 0    2. Subacute cough  - DX-CHEST-2 VIEWS; Future  - albuterol (Proventil) 2.5mg/3ml nebulizer solution 2.5 mg  - Referral to establish with PCP  - benzonatate (TESSALON) 100 MG Cap; Take 1 Capsule by mouth 3 times a day as needed for Cough.  Dispense: 30 Capsule; Refill: 0  - azithromycin (ZITHROMAX) 250 MG Tab; Take 1 Tablet by mouth every day for 5 days.  Dispense: 6 Tablet; Refill: 0    3. Wheezing  - DX-CHEST-2 VIEWS; Future  - albuterol (Proventil) 2.5mg/3ml nebulizer solution 2.5 mg  - methylPREDNISolone (MEDROL DOSEPAK) 4 MG Tablet Therapy Pack; Follow schedule on package instructions.  Dispense: 21 Tablet; Refill: 0  - Referral to establish with PCP  - albuterol 108 (90 Base) MCG/ACT Aero Soln inhalation aerosol; Inhale 1-2 Puffs every four hours as needed for Shortness of Breath.  Dispense: 8.5 g; Refill: 0    DX-CHEST-2 VIEWS  Result Date: 5/19/2025 5/19/2025 4:01 PM HISTORY/REASON FOR EXAM:  Cough TECHNIQUE/EXAM DESCRIPTION AND NUMBER OF VIEWS: Two views of the chest. COMPARISON:  None. FINDINGS: No pulmonary infiltrates or consolidations are noted. No pleural effusions, no pneumothorax are appreciated. Normal cardiopericardial silhouette.     1. No active cardiopulmonary abnormalities are identified.    Repeat vitals.    Symptomatic care.  -Oral hydration and rest.   -Cough control: nonpharmacologic options for cough relief such as throat lozenges, hot tea, honey.  -Flonase for post nasal drip symptoms.   -Antacids for acid reflux.  -Albuterol inhaler as directed.    Follow up with PCP. Follow up for increased or persistent shortness of breath or wheezing, fevers, elevated heart  rate, weakness, prolonged cough, chest pain, leg swelling, or any other concerns.    -Imaging ordered with cough > 1 month. No active cardiopulmonary abnormalities are identified. Discussed common causes of a persistent cough: including GERD, bronchitis or other recent URI, allergies, post nasal drip. Not on an ACEi. Prolonged cough from likely from bronchitis with wheeze noted. O2 99% with treatment. Has mild tachycardia. Patient denies chest pain. Encouraged oral hydration, advised ED follow up for persistent tachycardia, shortness of breath, or chest pain.     Differential diagnosis, natural history, supportive care, and indications for immediate follow-up discussed.         [1]   Past Medical History:  Diagnosis Date    No known health problems    [2]   Past Surgical History:  Procedure Laterality Date    ORIF, FINGER Right 9/1/2015    Procedure: FINGER ORIF FOURTH FINGER @ PIP JOINT;  Surgeon: Gómez Soto M.D.;  Location: SURGERY [facility];  Service:     LIGAMENT REPAIR Right 9/1/2015    Procedure: LIGAMENT REPAIR;  Surgeon: Gómez Soto M.D.;  Location: SURGERY [facility];  Service:     TONSILLECTOMY  1987   [3]   Allergies  Allergen Reactions    Nkda [No Known Drug Allergy]

## 2025-05-19 NOTE — PATIENT INSTRUCTIONS
Symptomatic care.  -Oral hydration and rest.   -Cough control: nonpharmacologic options for cough relief such as throat lozenges, hot tea, honey.  -Flonase for post nasal drip symptoms.   -Antacids for acid reflux.  -Albuterol inhaler as directed.    Follow up with PCP. Follow up for increased or persistent shortness of breath or wheezing, fevers, elevated heart rate, weakness, prolonged cough, chest pain, leg swelling, or any other concerns.

## 2025-05-22 VITALS
HEART RATE: 105 BPM | HEIGHT: 70 IN | OXYGEN SATURATION: 99 % | WEIGHT: 250.2 LBS | SYSTOLIC BLOOD PRESSURE: 124 MMHG | RESPIRATION RATE: 18 BRPM | DIASTOLIC BLOOD PRESSURE: 82 MMHG | BODY MASS INDEX: 35.82 KG/M2 | TEMPERATURE: 98.5 F